# Patient Record
Sex: FEMALE | Race: WHITE | Employment: OTHER | URBAN - METROPOLITAN AREA
[De-identification: names, ages, dates, MRNs, and addresses within clinical notes are randomized per-mention and may not be internally consistent; named-entity substitution may affect disease eponyms.]

---

## 2017-02-07 DIAGNOSIS — J45.31 ASTHMATIC BRONCHITIS, MILD PERSISTENT, WITH ACUTE EXACERBATION: ICD-10-CM

## 2017-02-07 DIAGNOSIS — E78.9 LIPID DISORDER: ICD-10-CM

## 2017-02-07 DIAGNOSIS — I10 ESSENTIAL HYPERTENSION: ICD-10-CM

## 2017-02-07 RX ORDER — ALBUTEROL SULFATE 90 UG/1
2 AEROSOL, METERED RESPIRATORY (INHALATION)
Qty: 1 INHALER | Refills: 1 | Status: SHIPPED | OUTPATIENT
Start: 2017-02-07 | End: 2017-03-28 | Stop reason: SDUPTHER

## 2017-02-07 RX ORDER — PRAVASTATIN SODIUM 40 MG/1
40 TABLET ORAL
Qty: 90 TAB | Refills: 3 | Status: SHIPPED | OUTPATIENT
Start: 2017-02-07 | End: 2018-01-29 | Stop reason: SDUPTHER

## 2017-02-07 RX ORDER — AMLODIPINE BESYLATE 10 MG/1
10 TABLET ORAL DAILY
Qty: 90 TAB | Refills: 3 | Status: SHIPPED | OUTPATIENT
Start: 2017-02-07 | End: 2018-01-29 | Stop reason: SDUPTHER

## 2017-02-07 NOTE — TELEPHONE ENCOUNTER
Patient would like refills of the following and would like to pick them up as hard scripts to take to [facility]. Requested Prescriptions     Pending Prescriptions Disp Refills    amLODIPine (NORVASC) 10 mg tablet 90 Tab 3     Sig: Take 1 Tab by mouth daily.  pravastatin (PRAVACHOL) 40 mg tablet 90 Tab 3     Sig: Take 1 Tab by mouth nightly.  albuterol (PROVENTIL HFA, VENTOLIN HFA, PROAIR HFA) 90 mcg/actuation inhaler 1 Inhaler 1     Sig: Take 2 Puffs by inhalation every six (6) hours as needed for Wheezing.

## 2017-03-28 ENCOUNTER — OFFICE VISIT (OUTPATIENT)
Dept: FAMILY MEDICINE CLINIC | Age: 67
End: 2017-03-28

## 2017-03-28 VITALS
WEIGHT: 225 LBS | OXYGEN SATURATION: 95 % | TEMPERATURE: 97.9 F | SYSTOLIC BLOOD PRESSURE: 136 MMHG | RESPIRATION RATE: 26 BRPM | HEART RATE: 64 BPM | BODY MASS INDEX: 41.41 KG/M2 | HEIGHT: 62 IN | DIASTOLIC BLOOD PRESSURE: 79 MMHG

## 2017-03-28 DIAGNOSIS — Z12.11 COLON CANCER SCREENING: ICD-10-CM

## 2017-03-28 DIAGNOSIS — Z13.220 LIPID SCREENING: ICD-10-CM

## 2017-03-28 DIAGNOSIS — Z13.5 GLAUCOMA SCREENING: ICD-10-CM

## 2017-03-28 DIAGNOSIS — Z13.29 SCREENING FOR THYROID DISORDER: ICD-10-CM

## 2017-03-28 DIAGNOSIS — H91.90 HOH (HARD OF HEARING), UNSPECIFIED LATERALITY: ICD-10-CM

## 2017-03-28 DIAGNOSIS — J40 BRONCHITIS: ICD-10-CM

## 2017-03-28 DIAGNOSIS — Z00.00 HEALTH CARE MAINTENANCE: Primary | ICD-10-CM

## 2017-03-28 DIAGNOSIS — R06.2 WHEEZING: ICD-10-CM

## 2017-03-28 DIAGNOSIS — Z13.31 SCREENING FOR DEPRESSION: ICD-10-CM

## 2017-03-28 DIAGNOSIS — J45.31 ASTHMATIC BRONCHITIS, MILD PERSISTENT, WITH ACUTE EXACERBATION: ICD-10-CM

## 2017-03-28 DIAGNOSIS — Z12.31 ENCOUNTER FOR SCREENING MAMMOGRAM FOR BREAST CANCER: ICD-10-CM

## 2017-03-28 DIAGNOSIS — R53.83 FATIGUE, UNSPECIFIED TYPE: ICD-10-CM

## 2017-03-28 DIAGNOSIS — M54.2 NECK PAIN: ICD-10-CM

## 2017-03-28 DIAGNOSIS — I89.0 LYMPHEDEMA: ICD-10-CM

## 2017-03-28 RX ORDER — AZITHROMYCIN 250 MG/1
TABLET, FILM COATED ORAL
Qty: 6 TAB | Refills: 0 | Status: SHIPPED | OUTPATIENT
Start: 2017-03-28 | End: 2017-04-02

## 2017-03-28 RX ORDER — BENZONATATE 200 MG/1
200 CAPSULE ORAL
Qty: 21 CAP | Refills: 0 | Status: SHIPPED | OUTPATIENT
Start: 2017-03-28 | End: 2017-04-04

## 2017-03-28 RX ORDER — PREDNISONE 20 MG/1
20 TABLET ORAL
Qty: 5 TAB | Refills: 0 | Status: SHIPPED | OUTPATIENT
Start: 2017-03-28 | End: 2018-01-29 | Stop reason: SDUPTHER

## 2017-03-28 RX ORDER — ALBUTEROL SULFATE 90 UG/1
2 AEROSOL, METERED RESPIRATORY (INHALATION)
Qty: 1 INHALER | Refills: 1 | Status: SHIPPED | OUTPATIENT
Start: 2017-03-28 | End: 2018-01-29 | Stop reason: SDUPTHER

## 2017-03-28 NOTE — PATIENT INSTRUCTIONS
Cancel your back injection    Labs and xrays today    Prescriptions printed    Stay active    Obtain breathing test and mammogram    Please call Aren Verde to help arrange and authorize any tests and/or referrals.   Her # is [phone] at [facility] is #[phone]

## 2017-03-28 NOTE — PROGRESS NOTES
Teresa Denny is a 69 y.o. female      Issues discussed today include:        Signs and symptoms:  Cough producing yellow sputum  Duration: several weeks  Context:  +exposures  Location:  Head and chest  Quality:  congestion  Severity:  Preventing rest  Timing:  now  Modifying factors:  No fevers, +wheezing. She's coughed so much her neck now hurts    Due to this cough, she needs to postpone JOSE for her back      Data reviewed or ordered today:  XR, labs    WELLNESS     GYN:  menopause  Mammogram:  Needs 2017  LMP:  [address]  last pap:  No longer getting  DEXA:  done    Hearing screen:   Done=bilateral hearing aids  Vision screening:  Needs 2017, Dr. Anushka Joshua  Depression screening:   Done, PHQ9 = 2, see scan  Fall assessment:   done    BMI: Body mass index is 41.15 kg/(m^2). I have reviewed/discussed the above normal BMI with the patient. I have recommended the following interventions: encourage exercise . .  letter      Colonoscopy:  consider colonoscopy Dr. Liudmila Cage #[phone]  FOBT:  2017  TDAP:    Pneumovax:  2016  PCV-13:  2015  Flu shot:  2016  Zostavax:  done  Eye exam:  Needs 2017  EK    FTF for DME:  done:  Hearing aids (she gets at [facility]), lymphedema pumping device, support hose  Advanced directive:  Full code  Specialists:  Chase Nelsonigham  Lymphedema clinic  [facility]  [facility] [address]    In general, I advise patients to be as active as possible. I believe exercise is the key to long life and good health. The current recommendation is for individuals to exercise for 150 minutes each week (in other words 30 minutes 5 days a week). Exercise should be vigorous enough to work up a sweat. These activities include brisk walking, running, tennis, swimming, weight-lifting, etc.     I usually tell folks that work is work and exercise is exercise. Each of these activities has a different goal.  Even though you may be active at work, it may not be aerobically adequate.   So build dedicated exercise time into your weekly routine. If a patient drinks alcohol, I suggest that a male drink only 2 beers (or glasses of wine, or shots of liquor) in any 24 hour period ( and not daily). For females, the limits are one drink per 24 hours (and not daily). After these limits, the toxic effects of alcohol consumption start to manifest.     Avoid tobacco products. I may provide separate information discussing specific smoking cessation instructions if needed. I suggest a wellness exam yearly during your birth month to update health maintenance issues such as fasting labs, EKGs and other studies, appropriate cancer screenings,  immunizations, etc.      I suggest a yearly flu shot    Please call Maggie Anderson to help arrange and authorize any tests or referrals. Her # is [phone]         Other problems include:  Patient Active Problem List   Diagnosis Code    History of normal resting EKG [id number]    CVD (cerebrovascular disease) I67.9    S/P colonoscopy Z98.890    GERD (gastroesophageal reflux disease) K21.9    [place] (hard of hearing) H91.90    Migraine G43.909   [address] maintenance Z00.00    Obesity, Class II, BMI 35-39.9, with comorbidity (HCC) E66.01    Osteoarthritis (arthritis due to wear and tear of joints) M19.90    DDD (degenerative disc disease), lumbar M51.36    Wheezing R06.2    Essential hypertension with goal blood pressure less than 140/90 I10    Pure hypercholesterolemia E78.00    Bilateral carotid artery stenosis I65.23    History of cervical dysplasia Z87.410    Lymphedema I89.0       Medications:  Current Outpatient Prescriptions   Medication Sig Dispense Refill    azithromycin (ZITHROMAX) 250 mg tablet Take 2 tablets today, then take 1 tablet daily 6 Tab 0    benzonatate (TESSALON) 200 mg capsule Take 1 Cap by mouth three (3) times daily as needed for Cough for up to 7 days. 21 Cap 0    predniSONE (DELTASONE) 20 mg tablet Take 1 Tab by mouth daily (with breakfast).  5 Tab 0    albuterol (PROVENTIL HFA, VENTOLIN HFA, PROAIR HFA) 90 mcg/actuation inhaler Take 2 Puffs by inhalation every six (6) hours as needed for Wheezing. 1 Inhaler 1    amLODIPine (NORVASC) 10 mg tablet Take 1 Tab by mouth daily. 90 Tab 3    pravastatin (PRAVACHOL) 40 mg tablet Take 1 Tab by mouth nightly. 90 Tab 3    aspirin delayed-release 81 mg tablet Take  by mouth daily.  calcium carbonate (TUMS) 200 mg calcium (500 mg) chew Take 1 Tab by mouth as needed.  docusate sodium (COLACE) 100 mg capsule Take 100 mg by mouth two (2) times a day.  ferrous sulfate (IRON, FERROUS SULFATE,) 325 mg (65 mg iron) tablet Take  by mouth Daily (before breakfast).  cholecalciferol, vitamin D3, (VITAMIN D3) 2,000 unit tab Take  by mouth daily. Allergies: Allergies   Allergen Reactions    Keflex [Cephalexin] Hives    Penicillins Hives    Sulfa (Sulfonamide Antibiotics) Unknown (comments)     Patient doesn't know or remember        LMP:  Patient's last menstrual period was 01/28/2000. Social History     Social History    Marital status:      Spouse name: N/A    Number of children: N/A    Years of education: N/A     Occupational History    Not on file.      Social History Main Topics    Smoking status: Former Smoker     Quit date: 1/28/1985    Smokeless tobacco: Never Used    Alcohol use No    Drug use: No    Sexual activity: Yes     Partners: Male     Other Topics Concern    Not on file     Social History Narrative         Family History   Problem Relation Age of Onset    Hypertension Father     Heart Disease Father     Diabetes Father     Heart Disease Mother     COPD Sister          Meaningful use:  done      ROS:  Headaches:  no  Chest Pain:  no  SOB:  no  Fevers:  no  Other significant ROS:  Cough, wheezing    Patient denied problems with:    speaking/swallowing, Reflux/indigestion, Cough,Diarrhea/constipation,Problems passing or controlling urine,  Mood (anxiety/depression/losing interest in doing things that were previously enjoyed),                                                             Any other Positive ROS include: fatigue, edema, shoulder and neck pain from coughing, she was supposed to get an JOSE soon for her back        Falls in the past 12 months:  no           Exercise:  Needs more             Smoking history:  former                                Patient's last menstrual period was 01/28/2000. Physical Exam  Visit Vitals    /79    Pulse 64    Temp 97.9 °F (36.6 °C) (Oral)    Resp 26    Ht 5' 2\" (1.575 m)    Wt 225 lb (102.1 kg)    LMP 01/28/2000    SpO2 95%    BMI 41.15 kg/m2     BP Readings from Last 3 Encounters:   03/28/17 136/79   12/22/16 122/76   07/06/16 142/78     Constitutional:  Appears well,  No Acute Distress, Vitals noted  Psychiatric:   Affect normal, Alert and cooperative, Oriented to person/place/time    Eyes:   Pupils equally round and reactive, EOMI, conjunctiva clear, eyelids normal  ENT:   External ears and nose normal/lips, teeth=OK/gums normal, TMs and Orophyarynx normal  Neck:   general inspection and Thyroid normal.  No abnormal cervical or supraclavicular nodes    Lungs:  +wheezing to auscultation, good respiratory effort  Heart: Ausculation normal.  Regular rhythm. No cardiac murmurs.   No carotid bruits or palpable thrills  Chest wall normal  Abd:  benign  Extremities:  ++ edema, good peripheral pulses  Skin:   Warm to palpation, without rashes, bruising, or suspicious lesions     Neuro:  No facial droop, speech fluent, EOMI, Pupils equally round and reactive to light, visual fields seem OK, hearing seems normal and symmetrical,smile symmetrical, puffs out cheeks symmetrically    Shoulder shrug symmetrical     moves all extremities, strength/sensationseem intact and symmetrical    Rapid alternating movements of hands normal and symmetrical    balance seems OK, no pronator drift, gait normal. \"get up and go\" test OK    squats OK, heel standing/toe standing OK    no tenderness of C spine, T spine, LS spine, flexion/extension of spine OK    Affect seems appropriate, no obvious mental processing problems    MSK:  Full ROM all joints                  Assessment:    Patient Active Problem List   Diagnosis Code    History of normal resting EKG [id number]    CVD (cerebrovascular disease) I67.9    S/P colonoscopy Z98.890    GERD (gastroesophageal reflux disease) K21.9    [place] (hard of hearing) H91.90    Migraine G43.909    Healthcare maintenance Z00.00    Obesity, Class II, BMI 35-39.9, with comorbidity (HCC) E66.01    Osteoarthritis (arthritis due to wear and tear of joints) M19.90    DDD (degenerative disc disease), lumbar M51.36    Wheezing R06.2    Essential hypertension with goal blood pressure less than 140/90 I10    Pure hypercholesterolemia E78.00    Bilateral carotid artery stenosis I65.23    History of cervical dysplasia Z87.410    Lymphedema I89.0       Today's diagnoses are:    ICD-10-CM ICD-9-CM    1. Health care maintenance Z00.00 V70.0    2. Wheezing R06.2 786.07 REFERRAL TO [facility] PROGRAMS      PULMONARY FUNCTION TEST      XR CHEST PA LAT   3. Lymphedema I89.0 457.1 REFERRAL TO [facility] PROGRAMS      [facility] 3RD GENERATION    FTF for DME for leg pumps and support hose, she sees lymphedema clinic   4. [place] (hard of hearing), unspecified laterality H91.90 389.9 REFERRAL TO [facility] PROGRAMS    sees [facility] for hearing aids   5. Screening for depression Z13.89 V79.0 MT DEPRESSION SCREEN ANNUAL   6. Colon cancer screening Z12.11 V76.51 AMB POC FECAL BLOOD, OCCULT, QL 3 CARDS   7. Encounter for screening mammogram for breast cancer Z12.31 V76.12 [facility] MAMMO BI SCREENING INCL CAD   8. BMI 40.0-44.9, adult ([address] Z68.41 V85.41 REFERRAL TO [facility] PROGRAMS      [facility] 3RD GENERATION    see letter   9. Neck pain M54.2 723.1 XR SPINE CERV 4 OR 5 V   10.  Bronchitis J40 490 azithromycin (ZITHROMAX) 250 mg tablet      benzonatate (TESSALON) 200 mg capsule predniSONE (DELTASONE) 20 mg tablet      CBC W/O DIFF      METABOLIC PANEL, COMPREHENSIVE   11. Lipid screening Z13.220 V77.91 CHOLESTEROL, HDL      CHOLESTEROL, TOTAL      LDL, DIRECT   12. Screening for thyroid disorder Z13.29 V77.0 TSH 3RD GENERATION   13. Fatigue, unspecified type R53.83 780.79 TSH 3RD GENERATION   14. Asthmatic bronchitis, mild persistent, with acute exacerbation J45.31 493.92 albuterol (PROVENTIL HFA, VENTOLIN HFA, PROAIR HFA) 90 mcg/actuation inhaler       Plan:  Orders Placed This Encounter    [facility] MAMMO BI SCREENING INCL CAD     Standing Status:   Future     Standing Expiration Date:   4/28/2018     Order Specific Question:   Reason for Exam     Answer:   screening    XR CHEST PA LAT     Standing Status:   Future     Number of Occurrences:   1     Standing Expiration Date:   4/28/2018     Order Specific Question:   Reason for Exam     Answer:   cough     Order Specific Question:   Is Patient Allergic to Contrast Dye? Answer:   Unknown    XR SPINE CERV 4 OR 5 V     Standing Status:   Future     Number of Occurrences:   1     Standing Expiration Date:   4/27/2018     Order Specific Question:   Reason for Exam     Answer:   neck pain     Order Specific Question:   Is Patient Allergic to Contrast Dye?      Answer:   Unknown    CBC W/O DIFF    METABOLIC PANEL, COMPREHENSIVE    CHOLESTEROL, HDL    CHOLESTEROL, TOTAL    LDL, DIRECT    TSH 3RD GENERATION    [facility]     Referral Priority:   Routine     Referral Type:   Consultation     Referral Reason:   Specialty Services Required    AMB FECAL OCCULT BLOOD QL-3 CARDS    PULMONARY FUNCTION TEST     Standing Status:   Future     Standing Expiration Date:   9/28/2017    ME DEPRESSION SCREEN ANNUAL    azithromycin (ZITHROMAX) 250 mg tablet     Sig: Take 2 tablets today, then take 1 tablet daily     Dispense:  6 Tab     Refill:  0    benzonatate (TESSALON) 200 mg capsule     Sig: Take 1 Cap by mouth three (3) times daily as needed for Cough for up to 7 days. Dispense:  21 Cap     Refill:  0    predniSONE (DELTASONE) 20 mg tablet     Sig: Take 1 Tab by mouth daily (with breakfast). Dispense:  5 Tab     Refill:  0    albuterol (PROVENTIL HFA, VENTOLIN HFA, PROAIR HFA) 90 mcg/actuation inhaler     Sig: Take 2 Puffs by inhalation every six (6) hours as needed for Wheezing. Dispense:  1 Inhaler     Refill:  1       See patient instructions  Patient Instructions   Cancel your back injection    Labs and xrays today    Prescriptions printed    Stay active    Obtain breathing test and mammogram    Please call Abbie to help arrange and authorize any tests and/or referrals.   Her # is [phone] at Jhonatan Madden is #[phone]          refresh note:  done    AVS Printed:  done

## 2017-03-28 NOTE — MR AVS SNAPSHOT
Visit Information Date & Time Provider Department Dept. Phone Encounter #  
 3/28/2017  3:00 PM Ladi De Anda MD [facility] [phone] [id number] Your Appointments 7/5/2017  1:20 PM  
ESTABLISHED PATIENT with Clarice Alanis MD  
[facility] ([facility] SCHEDULING) Appt Note: annual; 2/8/17 lm for pt of appt time and day change from 7/12/17  sll [address] [facility] [place] [address] [facility]  
[address] [facility] [place] [address] [address] Upcoming Health Maintenance Date Due FOBT Q 1 YEAR AGE 50-75 3/28/2018* MEDICARE YEARLY EXAM 3/29/2018 GLAUCOMA SCREENING Q2Y 3/28/2019 BREAST CANCER SCRN MAMMOGRAM 3/28/2019 DTaP/Tdap/Td series (2 - Td) 3/12/2025 *Topic was postponed. The date shown is not the original due date. Allergies as of 3/28/2017  Review Complete On: 3/28/2017 By: Adeola Blanchard Severity Noted Reaction Type Reactions Keflex [Cephalexin]  07/27/2015   Systemic Hives Penicillins  03/24/2016    Hives Sulfa (Sulfonamide Antibiotics)  04/07/2015    Unknown (comments) Patient doesn't know or remember Current Immunizations  Reviewed on 3/28/2017 Name Date Influenza Vaccine 9/1/2016, 9/28/2014 Pneumococcal Conjugate (PCV-13) 3/12/2015 Pneumococcal Vaccine (Unspecified Type) 8/1/2016 Tdap 3/12/2015 Reviewed by Adeola Blanchard on 3/28/2017 at  3:25 PM  
You Were Diagnosed With   
  
 Codes Comments Health care maintenance    -  Primary ICD-10-CM: Z00.00 ICD-9-CM: V70.0 Wheezing     ICD-10-CM: R06.2 ICD-9-CM: 786.07 Lymphedema     ICD-10-CM: I89.0 ICD-9-CM: 457.1 FTF for DME for leg pumps and support hose, she sees lymphedema clinic [place] (hard of hearing), unspecified laterality     ICD-10-CM: H91.90 ICD-9-CM: 389.9 sees [facility] for hearing aids  Screening for depression     ICD-10-CM: Z13.89 
 ICD-9-CM: V79.0 Colon cancer screening     ICD-10-CM: Z12.11 ICD-9-CM: V76.51 Encounter for screening mammogram for breast cancer     ICD-10-CM: Z12.31 
ICD-9-CM: V76.12 BMI 40.0-44.9, adult [facility])     ICD-10-CM: Z68.41 
ICD-9-CM: V85.41 see letter Neck pain     ICD-10-CM: M54.2 ICD-9-CM: 723.1 Bronchitis     ICD-10-CM: J40 ICD-9-CM: 071 Lipid screening     ICD-10-CM: H62.879 ICD-9-CM: V77.91 Screening for thyroid disorder     ICD-10-CM: Z13.29 ICD-9-CM: V77.0 Fatigue, unspecified type     ICD-10-CM: R53.83 ICD-9-CM: 780.79 Vitals BP Pulse Temp Resp Height(growth percentile) Weight(growth percentile) 136/79 64 97.9 °F (36.6 °C) (Oral) 26 5' 2\" (1.575 m) 225 lb (102.1 kg) LMP SpO2 BMI OB Status Smoking Status 01/28/2000 95% 41.15 kg/m2 Postmenopausal Former Smoker BMI and BSA Data Body Mass Index Body Surface Area  
 41.15 kg/m 2 2.11 m 2 Preferred Pharmacy Pharmacy Name Phone [facility] [phone] Your Updated Medication List  
  
   
This list is accurate as of: 3/28/17  4:13 PM.  Always use your most recent med list.  
  
  
  
  
 albuterol 90 mcg/actuation inhaler Commonly known as:  PROVENTIL HFA, VENTOLIN HFA, PROAIR HFA Take 2 Puffs by inhalation every six (6) hours as needed for Wheezing. amLODIPine 10 mg tablet Commonly known as:  Kimberli Croon Take 1 Tab by mouth daily. aspirin delayed-release 81 mg tablet Take  by mouth daily. azithromycin 250 mg tablet Commonly known as:  Delorse Sabot Take 2 tablets today, then take 1 tablet daily  
  
 benzonatate 200 mg capsule Commonly known as:  TESSALON Take 1 Cap by mouth three (3) times daily as needed for Cough for up to 7 days. calcium carbonate 200 mg calcium (500 mg) Chew Commonly known as:  TUMS Take 1 Tab by mouth as needed. docusate sodium 100 mg capsule Commonly known as:  stickK Farm  
 Take 100 mg by mouth two (2) times a day. Iron (Ferrous Sulfate) 325 mg (65 mg iron) tablet Generic drug:  ferrous sulfate Take  by mouth Daily (before breakfast). pravastatin 40 mg tablet Commonly known as:  PRAVACHOL Take 1 Tab by mouth nightly. predniSONE 20 mg tablet Commonly known as:  Alinda Jessica Take 1 Tab by mouth daily (with breakfast). VITAMIN D3 2,000 unit Tab Generic drug:  cholecalciferol (vitamin D3) Take  by mouth daily. Prescriptions Printed Refills  
 azithromycin (ZITHROMAX) 250 mg tablet 0 Sig: Take 2 tablets today, then take 1 tablet daily Class: Print  
 benzonatate (TESSALON) 200 mg capsule 0 Sig: Take 1 Cap by mouth three (3) times daily as needed for Cough for up to 7 days. Class: Print Route: Oral  
 predniSONE (DELTASONE) 20 mg tablet 0 Sig: Take 1 Tab by mouth daily (with breakfast). Class: Print Route: Oral  
  
We Performed the Following AMB POC FECAL BLOOD, OCCULT, QL 3 CARDS [98823 CPT(R)] CBC W/O DIFF [27711 CPT(R)] CHOLESTEROL, HDL [id number] CPT(R)] CHOLESTEROL, TOTAL [97726 CPT(R)] LDL, DIRECT [id number] CPT(R)] METABOLIC PANEL, COMPREHENSIVE [36875 CPT(R)] [address] [place] clypd [ [facility]] REFERRAL TO [facility] PROGRAMS [[id number] Custom] Comments:  
 patient has been referred into the [facility] Programs indicated above.   She is currently being managed for the following chronic conditions: has History of normal resting EKG, CVD (cerebrovascular disease), S/P colonoscopy, GERD (gastroesophageal reflux disease), [place] (hard of hearing), Migraine, Healthcare maintenance, Obesity, Class II, BMI 35-39.9, with comorbidity ([address] Osteoarthritis (arthritis due to wear and tear of joints), DDD (degenerative disc disease), lumbar, Wheezing, Essential hypertension with goal blood pressure less than 140/90, Pure hypercholesterolemia, Bilateral carotid artery stenosis, History of cervical dysplasia, and Lymphedema on her problem list.  
 TSH 3RD GENERATION [47681 CPT(R)] To-Do List   
 03/28/2017 Imaging:  [facility] MAMMO BI SCREENING INCL CAD   
  
 03/28/2017 PFT:  PULMONARY FUNCTION TEST   
  
 03/28/2017 Imaging:  XR CHEST PA LAT   
  
 03/28/2017 Imaging:  XR SPINE CERV 4 OR 5 V Referral Information Referral ID Referred By Referred To  
  
 [id number] Melanie Celaya Not Available Visits Status Start Date End Date 1 New Request 3/28/17 3/28/18 If your referral has a status of pending review or denied, additional information will be sent to support the outcome of this decision. Patient Instructions Cancel your back injection Labs and xrays today Prescriptions printed Stay active Obtain breathing test 
 
Please call Delmi Hanna to help arrange and authorize any tests and/or referrals. Her # [phone] Central Scheduling at Romayne Duster is #[phone] Introducing [facility] SERVICES! Dear Rolando Esparza: Thank you for requesting a [employer] account. Our records indicate that you already have an active [employer] account. You can access your account anytime at [email or web address] Did you know that you can access your hospital and ER discharge instructions at any time in [employer]? You can also review all of your test results from your hospital stay or ER visit. Additional Information If you have questions, please visit the Frequently Asked Questions section of the [employer] website at [email or web address] Remember, [employer] is NOT to be used for urgent needs. For medical emergencies, dial 911. Now available from your iPhone and Android! Please provide this summary of care documentation to your next provider. Your primary care clinician is listed as Jesús Angel. If you have any questions after today's visit, please call [phone].

## 2017-03-28 NOTE — LETTER
3/28/2017 6:12 PM 
 
Ms. Frankie Gil 
[address] [facility] [address] [phone] See Dr. Rajesh Taveras for your yearly eye exam.  #[phone]. Please call [facility] to help arrange and authorize any tests and/or referrals. Her # is [phone] Sincerely, Tatiana Bloch, MD

## 2017-03-28 NOTE — LETTER
3/28/2017 4:07 PM 
 
Ms. Candy Soto 
[address]  [place] [address] [phone] Body mass index is 41.15 kg/(m^2). Focus on regular exercise (150 minutes each week) and healthy eating. Eat more fruits and vegetables. Eat more protein (egg whites, beans, and nuts you know you tolerate) and less carbohydrates (white bread, white rice, white pasta, white potatoes, sodas, and sweets). Eat appropriately small portion sizes. Sincerely, Phill Clark MD

## 2017-03-28 NOTE — LETTER
3/28/2017 6:14 PM 
 
Ms. Coco Dan 
[address] [phone] Body mass index is 41.15 kg/(m^2). Focus on regular exercise (150 minutes each week) and healthy eating. Eat more fruits and vegetables. Eat more protein (egg whites, beans, and nuts you know you tolerate) and less carbohydrates (white bread, white rice, white pasta, white potatoes, sodas, and sweets). Eat appropriately small portion sizes. .   
 
 
 
 
Sincerely, Venecia Stafford MD

## 2017-03-28 NOTE — PROGRESS NOTES
Chief Complaint   Patient presents with    Welcome To Medicare     is not fasting. Shoulder pain on both sides for a few months. Has a hard time turning head side to side. Has a cough that she had in December that went away but has come back. Reviewed record in preparation for visit and have obtained necessary documentation. 1. Have you been to the ER, urgent care clinic since your last visit? Hospitalized since your last visit? No    2. Have you seen or consulted any other health care providers outside of the [address] [place] [address] since your last visit? Include any pap smears or colon screening.  No

## 2017-04-04 ENCOUNTER — LAB ONLY (OUTPATIENT)
Dept: FAMILY MEDICINE CLINIC | Age: 67
End: 2017-04-04

## 2017-04-04 ENCOUNTER — HOSPITAL ENCOUNTER (OUTPATIENT)
Dept: LAB | Age: 67
Discharge: HOME | End: 2017-04-04
Payer: MEDICARE

## 2017-04-04 PROCEDURE — 85027 COMPLETE CBC AUTOMATED: CPT

## 2017-04-04 PROCEDURE — 84443 ASSAY THYROID STIM HORMONE: CPT

## 2017-04-04 PROCEDURE — 83718 ASSAY OF LIPOPROTEIN: CPT

## 2017-04-04 PROCEDURE — 82465 ASSAY BLD/SERUM CHOLESTEROL: CPT

## 2017-04-04 PROCEDURE — 80053 COMPREHEN METABOLIC PANEL: CPT

## 2017-04-04 PROCEDURE — 83721 ASSAY OF BLOOD LIPOPROTEIN: CPT

## 2017-04-04 NOTE — MR AVS SNAPSHOT
Visit Information Date & Time Provider Department Dept. Phone Encounter #  
 4/4/2017  8:15 AM LAB [facility] [address] [facility] [phone] [id number] Your Appointments 7/5/2017  1:20 PM  
ESTABLISHED PATIENT with Pradip Clayton MD  
[facility] ([facility] SCHEDULING) Appt Note: annual; 2/8/17 lm for pt of appt time and day change from 7/12/17  sll 354 [facility] [place] [address] 1007 [facility]  
54 [facility] [place] [address] [address] Upcoming Health Maintenance Date Due FOBT Q 1 YEAR AGE 50-75 3/28/2018* MEDICARE YEARLY EXAM 3/29/2018 GLAUCOMA SCREENING Q2Y 3/28/2019 BREAST CANCER SCRN MAMMOGRAM 3/28/2019 DTaP/Tdap/Td series (2 - Td) 3/12/2025 *Topic was postponed. The date shown is not the original due date. Allergies as of 4/4/2017  Review Complete On: 3/28/2017 By: Olimpia Chau MD  
  
 Severity Noted Reaction Type Reactions Keflex [Cephalexin]  07/27/2015   Systemic Hives Penicillins  03/24/2016    Hives Sulfa (Sulfonamide Antibiotics)  04/07/2015    Unknown (comments) Patient doesn't know or remember Current Immunizations  Reviewed on 3/28/2017 Name Date Influenza Vaccine 9/1/2016, 9/28/2014 Pneumococcal Conjugate (PCV-13) 3/12/2015 Pneumococcal Vaccine (Unspecified Type) 8/1/2016 Tdap 3/12/2015 Not reviewed this visit Vitals LMP OB Status Smoking Status 01/28/2000 Postmenopausal Former Smoker Preferred Pharmacy Pharmacy Name Phone [address] [phone] Your Updated Medication List  
  
   
This list is accurate as of: 4/4/17  4:39 PM.  Always use your most recent med list.  
  
  
  
  
 albuterol 90 mcg/actuation inhaler Commonly known as:  PROVENTIL HFA, VENTOLIN HFA, PROAIR HFA  
 Take 2 Puffs by inhalation every six (6) hours as needed for Wheezing. amLODIPine 10 mg tablet Commonly known as:  John Gerson Take 1 Tab by mouth daily. aspirin delayed-release 81 mg tablet Take  by mouth daily. benzonatate 200 mg capsule Commonly known as:  TESSALON Take 1 Cap by mouth three (3) times daily as needed for Cough for up to 7 days. calcium carbonate 200 mg calcium (500 mg) Chew Commonly known as:  TUMS Take 1 Tab by mouth as needed. docusate sodium 100 mg capsule Commonly known as:  Bill Tucson Take 100 mg by mouth two (2) times a day. Iron (Ferrous Sulfate) 325 mg (65 mg iron) tablet Generic drug:  ferrous sulfate Take  by mouth Daily (before breakfast). pravastatin 40 mg tablet Commonly known as:  PRAVACHOL Take 1 Tab by mouth nightly. predniSONE 20 mg tablet Commonly known as:  Sherra Clinton Take 1 Tab by mouth daily (with breakfast). VITAMIN D3 2,000 unit Tab Generic drug:  cholecalciferol (vitamin D3) Take  by mouth daily. To-Do List   
 04/07/2017 9:00 AM  
  Appointment with [facility] MATEUSZ 2 at [facility] Mammography ([phone]) Shower or bathe using soap and water. Do not use deodorant, powder, perfumes, or lotion the day of your exam.  If your prior mammograms were not performed at [facility] 6 please bring films with you or forward prior images 2 days before your procedure. Check in at registration 15min before your appointment time unless you were instructed to do otherwise. A script is not necessary, but if you have one, please bring it on the day of the mammogram or have it faxed to the department. [facility] [phone] [facility]  [phone] [facility] [phone] CARISA  [phone] [facility] [phone] [facility] [phone] [facility] [phone] [facility] [phone]  
  
 04/07/2017 10:00 AM  
(Arrive by 9:30 AM) Appointment with PULMONARY LAB [facility] at [facility] PULMONARY LAB ([phone]) 1. Bring list of current medications or bag medicines. 2. No breathing medicines 6 hours before the procedure. 3. Patient should eat light the day of procedure. Introducing [facility] SERVICES! Dear Benton Lefort: Thank you for requesting a Char Software account. Our records indicate that you already have an active Char Software account. You can access your account anytime at [email or web address] Did you know that you can access your hospital and ER discharge instructions at any time in Char Software? You can also review all of your test results from your hospital stay or ER visit. Additional Information If you have questions, please visit the Frequently Asked Questions section of the Char Software website at [email or web address] Remember, Char Software is NOT to be used for urgent needs. For medical emergencies, dial 911. Now available from your iPhone and Android! Please provide this summary of care documentation to your next provider. Your primary care clinician is listed as Machelle Leger. If you have any questions after today's visit, please call [phone].

## 2017-04-05 LAB
ALBUMIN SERPL-MCNC: 4.1 G/DL (ref 3.6–4.8)
ALBUMIN/GLOB SERPL: 1.6 {RATIO} (ref 1.2–2.2)
ALP SERPL-CCNC: 50 IU/L (ref 39–117)
ALT SERPL-CCNC: 22 IU/L (ref 0–32)
AST SERPL-CCNC: 24 IU/L (ref 0–40)
BILIRUB SERPL-MCNC: 0.4 MG/DL (ref 0–1.2)
BUN SERPL-MCNC: 15 MG/DL (ref 8–27)
BUN/CREAT SERPL: 17 (ref 12–28)
CALCIUM SERPL-MCNC: 9.3 MG/DL (ref 8.7–10.3)
CHLORIDE SERPL-SCNC: 100 MMOL/L (ref 96–106)
CHOLEST SERPL-MCNC: 159 MG/DL (ref 100–199)
CO2 SERPL-SCNC: 27 MMOL/L (ref 18–29)
CREAT SERPL-MCNC: 0.89 MG/DL (ref 0.57–1)
ERYTHROCYTE [DISTWIDTH] IN BLOOD BY AUTOMATED COUNT: 14 % (ref 12.3–15.4)
GLOBULIN SER CALC-MCNC: 2.5 G/DL (ref 1.5–4.5)
GLUCOSE SERPL-MCNC: 97 MG/DL (ref 65–99)
HCT VFR BLD AUTO: 40 % (ref 34–46.6)
HDLC SERPL-MCNC: 70 MG/DL
HGB BLD-MCNC: 13 G/DL (ref 11.1–15.9)
INTERPRETATION, 910389: NORMAL
LDLC SERPL DIRECT ASSAY-MCNC: 80 MG/DL (ref 0–99)
MCH RBC QN AUTO: 30 PG (ref 26.6–33)
MCHC RBC AUTO-ENTMCNC: 32.5 G/DL (ref 31.5–35.7)
MCV RBC AUTO: 92 FL (ref 79–97)
PLATELET # BLD AUTO: 403 X10E3/UL (ref 150–379)
POTASSIUM SERPL-SCNC: 5 MMOL/L (ref 3.5–5.2)
PROT SERPL-MCNC: 6.6 G/DL (ref 6–8.5)
RBC # BLD AUTO: 4.34 X10E6/UL (ref 3.77–5.28)
SODIUM SERPL-SCNC: 143 MMOL/L (ref 134–144)
TSH SERPL DL<=0.005 MIU/L-ACNC: 4.33 UIU/ML (ref 0.45–4.5)
WBC # BLD AUTO: 9.8 X10E3/UL (ref 3.4–10.8)

## 2017-04-05 NOTE — PROGRESS NOTES
Your labs look good. Focus on regular exercise (150 minutes each week) and healthy eating. Eat more fruits and vegetables. Eat more protein (egg whites, beans, and nuts you know you tolerate) and less carbohydrates (white bread, white rice, white pasta, white potatoes, sodas, and sweets). Eat appropriately small portion sizes.

## 2017-04-07 ENCOUNTER — HOSPITAL ENCOUNTER (OUTPATIENT)
Dept: PULMONOLOGY | Age: 67
Discharge: HOME | End: 2017-04-07
Payer: MEDICARE

## 2017-04-07 ENCOUNTER — HOSPITAL ENCOUNTER (OUTPATIENT)
Dept: MAMMOGRAPHY | Age: 67
Discharge: HOME | End: 2017-04-07
Payer: MEDICARE

## 2017-04-07 DIAGNOSIS — Z12.31 ENCOUNTER FOR SCREENING MAMMOGRAM FOR BREAST CANCER: ICD-10-CM

## 2017-04-07 DIAGNOSIS — R06.2 WHEEZING: ICD-10-CM

## 2017-04-07 PROCEDURE — 74011000250 HC RX REV CODE- 250

## 2017-04-07 PROCEDURE — 94060 EVALUATION OF WHEEZING: CPT

## 2017-04-07 PROCEDURE — 77063 BREAST TOMOSYNTHESIS BI: CPT

## 2017-04-07 RX ORDER — ALBUTEROL SULFATE 0.83 MG/ML
2.5 SOLUTION RESPIRATORY (INHALATION)
Status: COMPLETED | OUTPATIENT
Start: 2017-04-07 | End: 2017-04-07

## 2017-04-07 RX ADMIN — ALBUTEROL SULFATE 2.5 MG: 2.5 SOLUTION RESPIRATORY (INHALATION) at 10:45

## 2017-04-07 NOTE — LETTER
4/10/2017 4:45 PM 
 
Ms. Krishna Reilly 
[address]  [place] [phone] Dear Krishna Reilly: 
 
Please find your most recent results below. Resulted Orders MATEUSZ 3D LORA W MAMMO BI SCREENING INCL CAD Narrative STUDY: Bilateral digital screening mammogram with 3-D tomosynthesis INDICATION:  Screening. COMPARISON:  Mammograms 4/9/2015 through 4/10/2012. BREAST COMPOSITION:  There are scattered areas of fibroglandular density. FINDINGS: Bilateral digital screening mammography was performed and is 
interpreted in conjunction with a computer assisted detection (CAD) system. Additionally, tomosynthesis of both breasts in the CC and MLO projections was 
performed. No suspicious masses or calcifications are identified. There has been 
no significant change. Impression IMPRESSION: 
BI-RADS 1: Negative. No mammographic evidence of malignancy. RECOMMENDATIONS: 
Next screening mammogram is recommended in one year. The patient will be notified of these results. Your recent mammogram was normal.  Congratulations.  Please follow up in one year. Sincerely, Selma Solano MD

## 2017-08-29 ENCOUNTER — OFFICE VISIT (OUTPATIENT)
Dept: CARDIOLOGY CLINIC | Age: 67
End: 2017-08-29

## 2017-08-29 VITALS
HEIGHT: 62 IN | DIASTOLIC BLOOD PRESSURE: 70 MMHG | HEART RATE: 71 BPM | WEIGHT: 231 LBS | SYSTOLIC BLOOD PRESSURE: 130 MMHG | RESPIRATION RATE: 16 BRPM | OXYGEN SATURATION: 98 % | BODY MASS INDEX: 42.51 KG/M2

## 2017-08-29 DIAGNOSIS — I10 ESSENTIAL HYPERTENSION WITH GOAL BLOOD PRESSURE LESS THAN 140/90: Primary | ICD-10-CM

## 2017-08-29 DIAGNOSIS — E78.5 DYSLIPIDEMIA: ICD-10-CM

## 2017-08-29 NOTE — MR AVS SNAPSHOT
Visit Information Date & Time Provider Department Dept. Phone Encounter #  
 8/29/2017  3:20 PM Tonie Reed [facility] [phone] [id number] Follow-up Instructions Return in about 6 months (around 2/28/2018). Your Appointments 8/30/2018 10:00 AM  
ESTABLISHED PATIENT with Tonie Reed MD  
[facility] (JONO SCHEDULING) Appt Note: annual  
 [address] [place] [address] 1007 [facility]  
54 [facility] [place] [address] [address] Upcoming Health Maintenance Date Due INFLUENZA AGE 9 TO ADULT 8/1/2017 FOBT Q 1 YEAR AGE 50-75 3/28/2018* MEDICARE YEARLY EXAM 3/29/2018 GLAUCOMA SCREENING Q2Y 3/28/2019 BREAST CANCER SCRN MAMMOGRAM 4/7/2019 DTaP/Tdap/Td series (2 - Td) 3/12/2025 *Topic was postponed. The date shown is not the original due date. Allergies as of 8/29/2017  Review Complete On: 8/29/2017 By: Tonie Reed MD  
  
 Severity Noted Reaction Type Reactions Keflex [Cephalexin]  07/27/2015   Systemic Hives Penicillins  03/24/2016    Hives Sulfa (Sulfonamide Antibiotics)  04/07/2015    Unknown (comments) Patient doesn't know or remember Current Immunizations  Reviewed on 3/28/2017 Name Date Influenza Vaccine 9/1/2016, 9/28/2014 Pneumococcal Conjugate (PCV-13) 3/12/2015 Pneumococcal Vaccine (Unspecified Type) 8/1/2016 Tdap 3/12/2015 Not reviewed this visit You Were Diagnosed With   
  
 Codes Comments Essential hypertension with goal blood pressure less than 140/90    -  Primary ICD-10-CM: I10 
ICD-9-CM: 401.9 Dyslipidemia     ICD-10-CM: E78.5 ICD-9-CM: 272.4 Vitals BP Pulse Resp Height(growth percentile) Weight(growth percentile) LMP  
 130/70 (BP 1 Location: Left arm, BP Patient Position: Sitting) 71 16 5' 2\" (1.575 m) 231 lb (104.8 kg) 01/28/2000 SpO2 BMI OB Status Smoking Status 98% 42.25 kg/m2 Postmenopausal Former Smoker Vitals History BMI and BSA Data Body Mass Index Body Surface Area  
 42.25 kg/m 2 2.14 m 2 Preferred Pharmacy Pharmacy Name Phone [address] [phone] Your Updated Medication List  
  
   
This list is accurate as of: 8/29/17  3:46 PM.  Always use your most recent med list.  
  
  
  
  
 albuterol 90 mcg/actuation inhaler Commonly known as:  PROVENTIL HFA, VENTOLIN HFA, PROAIR HFA Take 2 Puffs by inhalation every six (6) hours as needed for Wheezing. amLODIPine 10 mg tablet Commonly known as:  Owen Emerald Take 1 Tab by mouth daily. aspirin delayed-release 81 mg tablet Take  by mouth daily. calcium carbonate 200 mg calcium (500 mg) Chew Commonly known as:  TUMS Take 1 Tab by mouth as needed. docusate sodium 100 mg capsule Commonly known as:  Hermina  Take 100 mg by mouth two (2) times a day. Iron (Ferrous Sulfate) 325 mg (65 mg iron) tablet Generic drug:  ferrous sulfate Take  by mouth Daily (before breakfast). pravastatin 40 mg tablet Commonly known as:  PRAVACHOL Take 1 Tab by mouth nightly. predniSONE 20 mg tablet Commonly known as:  Rere Smoker Take 1 Tab by mouth daily (with breakfast). VITAMIN D3 2,000 unit Tab Generic drug:  cholecalciferol (vitamin D3) Take  by mouth daily. We Performed the Following AMB POC EKG ROUTINE W/ 12 LEADS, INTER & REP [48531 CPT(R)] Follow-up Instructions Return in about 6 months (around 2/28/2018). Introducing [facility] & HEALTH SERVICES! Dear Chavo Galloway: Thank you for requesting a Voyat account. Our records indicate that you already have an active Voyat account. You can access your account anytime at [email or web address] Did you know that you can access your hospital and ER discharge instructions at any time in Invenias? You can also review all of your test results from your hospital stay or ER visit. Additional Information If you have questions, please visit the Frequently Asked Questions section of the Invenias website at [email or web address] Remember, Invenias is NOT to be used for urgent needs. For medical emergencies, dial 911. Now available from your iPhone and Android! Please provide this summary of care documentation to your next provider. Your primary care clinician is listed as Cindy Lambert. If you have any questions after today's visit, please call [phone].

## 2017-08-29 NOTE — PROGRESS NOTES
Visit Vitals    /70 (BP 1 Location: Left arm, BP Patient Position: Sitting)    Pulse 71    Resp 16    Ht 5' 2\" (1.575 m)    Wt 231 lb (104.8 kg)    LMP 01/28/2000    SpO2 98%    BMI 42.25 kg/m2

## 2018-01-29 ENCOUNTER — OFFICE VISIT (OUTPATIENT)
Dept: FAMILY MEDICINE CLINIC | Age: 68
End: 2018-01-29

## 2018-01-29 VITALS
HEART RATE: 82 BPM | RESPIRATION RATE: 16 BRPM | TEMPERATURE: 99 F | BODY MASS INDEX: 43.06 KG/M2 | SYSTOLIC BLOOD PRESSURE: 135 MMHG | WEIGHT: 234 LBS | OXYGEN SATURATION: 96 % | HEIGHT: 62 IN | DIASTOLIC BLOOD PRESSURE: 79 MMHG

## 2018-01-29 DIAGNOSIS — E78.9 LIPID DISORDER: ICD-10-CM

## 2018-01-29 DIAGNOSIS — R73.09 ELEVATED GLUCOSE: ICD-10-CM

## 2018-01-29 DIAGNOSIS — J45.31 ASTHMATIC BRONCHITIS, MILD PERSISTENT, WITH ACUTE EXACERBATION: Primary | ICD-10-CM

## 2018-01-29 DIAGNOSIS — R05.9 COUGH: ICD-10-CM

## 2018-01-29 DIAGNOSIS — J40 BRONCHITIS: ICD-10-CM

## 2018-01-29 DIAGNOSIS — Z13.1 SCREENING FOR DIABETES MELLITUS: ICD-10-CM

## 2018-01-29 DIAGNOSIS — Z13.89 SCREENING FOR OBESITY: ICD-10-CM

## 2018-01-29 DIAGNOSIS — I10 ESSENTIAL HYPERTENSION WITH GOAL BLOOD PRESSURE LESS THAN 140/90: ICD-10-CM

## 2018-01-29 DIAGNOSIS — Z13.31 SCREENING FOR DEPRESSION: ICD-10-CM

## 2018-01-29 DIAGNOSIS — I10 ESSENTIAL HYPERTENSION: ICD-10-CM

## 2018-01-29 DIAGNOSIS — E61.1 LOW IRON: ICD-10-CM

## 2018-01-29 DIAGNOSIS — R53.83 FATIGUE, UNSPECIFIED TYPE: ICD-10-CM

## 2018-01-29 DIAGNOSIS — R06.2 WHEEZING: ICD-10-CM

## 2018-01-29 DIAGNOSIS — E66.01 OBESITY, MORBID (HCC): ICD-10-CM

## 2018-01-29 LAB
FLUAV+FLUBV AG NOSE QL IA.RAPID: NEGATIVE POS/NEG
FLUAV+FLUBV AG NOSE QL IA.RAPID: NEGATIVE POS/NEG
S PYO AG THROAT QL: NEGATIVE
VALID INTERNAL CONTROL?: YES
VALID INTERNAL CONTROL?: YES

## 2018-01-29 RX ORDER — AZITHROMYCIN 250 MG/1
TABLET, FILM COATED ORAL
Qty: 6 TAB | Refills: 0 | Status: SHIPPED | OUTPATIENT
Start: 2018-01-29 | End: 2018-02-03

## 2018-01-29 RX ORDER — AMLODIPINE BESYLATE 10 MG/1
10 TABLET ORAL DAILY
Qty: 90 TAB | Refills: 3 | Status: SHIPPED | OUTPATIENT
Start: 2018-01-29 | End: 2018-10-12 | Stop reason: SDUPTHER

## 2018-01-29 RX ORDER — LINCOMYCIN HYDROCHLORIDE 300 MG/ML
300 INJECTION, SOLUTION INTRAMUSCULAR; INTRAVENOUS; SUBCONJUNCTIVAL ONCE
Qty: 1 VIAL | Refills: 0
Start: 2018-01-29 | End: 2018-01-29

## 2018-01-29 RX ORDER — LANOLIN ALCOHOL/MO/W.PET/CERES
325 CREAM (GRAM) TOPICAL
Qty: 90 TAB | Refills: 0 | Status: SHIPPED | OUTPATIENT
Start: 2018-01-29 | End: 2018-04-03 | Stop reason: SDUPTHER

## 2018-01-29 RX ORDER — PRAVASTATIN SODIUM 40 MG/1
40 TABLET ORAL
Qty: 90 TAB | Refills: 3 | Status: SHIPPED | OUTPATIENT
Start: 2018-01-29 | End: 2018-10-12 | Stop reason: SDUPTHER

## 2018-01-29 RX ORDER — ALBUTEROL SULFATE 90 UG/1
2 AEROSOL, METERED RESPIRATORY (INHALATION)
Qty: 1 INHALER | Refills: 1 | Status: SHIPPED | OUTPATIENT
Start: 2018-01-29 | End: 2018-10-12 | Stop reason: SDUPTHER

## 2018-01-29 RX ORDER — PREDNISONE 20 MG/1
20 TABLET ORAL
Qty: 5 TAB | Refills: 0 | Status: SHIPPED | OUTPATIENT
Start: 2018-01-29 | End: 2018-05-22

## 2018-01-29 NOTE — LETTER
1/29/2018 6:34 PM 
 
Ms. Jessica Sue 
[address] [facility] [phone] Body mass index is 42.8 kg/(m^2). Focus on regular exercise (150 minutes each week) and healthy eating. Eat more fruits and vegetables. Eat more protein (egg whites, beans, and nuts you know you tolerate) and less carbohydrates (white bread, white rice, white pasta, white potatoes, sodas, and sweets). Eat appropriately small portion sizes. WELLNESS exam soon Sincerely, Ammy Lewis MD

## 2018-01-29 NOTE — PROGRESS NOTES
Chief Complaint   Patient presents with    Sinus Infection     sob, productive coughing, congestion in lungs. Started last night. No fever     1. Have you been to the ER, urgent care clinic since your last visit? Hospitalized since your last visit? No    2. Have you seen or consulted any other health care providers outside of the [address] [place] [address] since your last visit? Include any pap smears or colon screening.  No

## 2018-01-29 NOTE — MR AVS SNAPSHOT
2100 [facility] [address] [place] [address] 
[phone] Patient: Alek Vidal MRN: [id number] Visit Information Date & Time Provider Department Dept. Phone Encounter #  
 1/29/2018  6:15 PM Joaquin Beatty MD [facility] [phone] [id number] Your Appointments 8/30/2018 10:00 AM  
ESTABLISHED PATIENT with Misa Munoz MD  
[facility] ([facility]ON[facility] SCHEDULING) Appt Note: annual  
 354 [facility] [place] [phone] [facility]  
54 [facility] [place] [address] [address] [place] [address] Upcoming Health Maintenance Date Due FOBT Q 1 YEAR AGE 50-75 3/28/2018* MEDICARE YEARLY EXAM 3/29/2018 GLAUCOMA SCREENING Q2Y 3/28/2019 BREAST CANCER SCRN MAMMOGRAM 4/7/2019 DTaP/Tdap/Td series (2 - Td) 3/12/2025 *Topic was postponed. The date shown is not the original due date. Allergies as of 1/29/2018  Review Complete On: 1/29/2018 By: Joaquin Beatty MD  
  
 Severity Noted Reaction Type Reactions Keflex [Cephalexin]  07/27/2015   Systemic Hives Penicillins  03/24/2016    Hives Sulfa (Sulfonamide Antibiotics)  04/07/2015    Unknown (comments) Patient doesn't know or remember Current Immunizations  Reviewed on 10/27/2017 Name Date Influenza Vaccine 10/12/2017, 9/1/2016, 9/28/2014 Pneumococcal Conjugate (PCV-13) 3/12/2015 Pneumococcal Vaccine (Unspecified Type) 8/1/2016 Tdap 3/12/2015 Not reviewed this visit You Were Diagnosed With   
  
 Codes Comments Cough    -  Primary ICD-10-CM: J23 ICD-9-CM: 786.2 BMI 40.0-44.9, adult [facility])     ICD-10-CM: Z68.41 
ICD-9-CM: V85.41 Essential hypertension with goal blood pressure less than 140/90     ICD-10-CM: I10 
ICD-9-CM: 401. 9 Wheezing     ICD-10-CM: R06.2 ICD-9-CM: 786.07   
 Asthmatic bronchitis, mild persistent, with acute exacerbation     ICD-10-CM: J45.31 
ICD-9-CM: 493.92 Essential hypertension     ICD-10-CM: I10 
ICD-9-CM: 401.9 Lipid disorder     ICD-10-CM: E78.9 ICD-9-CM: 272.9 Screening for diabetes mellitus     ICD-10-CM: Z13.1 ICD-9-CM: V77.1 Elevated glucose     ICD-10-CM: R73.09 
ICD-9-CM: 790.29 Fatigue, unspecified type     ICD-10-CM: R53.83 ICD-9-CM: 780.79 Low iron     ICD-10-CM: E61.1 ICD-9-CM: 280.9 Screening for obesity     ICD-10-CM: Z13.89 ICD-9-CM: V77.8 Screening for depression     ICD-10-CM: Z13.89 ICD-9-CM: V79.0 Bronchitis     ICD-10-CM: J40 ICD-9-CM: 252 Vitals BP Pulse Temp Resp Height(growth percentile) Weight(growth percentile) (!) 171/125 (BP 1 Location: Right arm, BP Patient Position: Sitting) 82 99 °F (37.2 °C) (Oral) 16 5' 2\" (1.575 m) 234 lb (106.1 kg) LMP SpO2 BMI OB Status Smoking Status 01/28/2000 96% 42.8 kg/m2 Postmenopausal Former Smoker Vitals History BMI and BSA Data Body Mass Index Body Surface Area  
 42.8 kg/m 2 2.15 m 2 Preferred Pharmacy Pharmacy Name Phone 109 [facility] [phone] Your Updated Medication List  
  
   
This list is accurate as of: 1/29/18  6:44 PM.  Always use your most recent med list.  
  
  
  
  
 albuterol 90 mcg/actuation inhaler Commonly known as:  PROVENTIL HFA, VENTOLIN HFA, PROAIR HFA Take 2 Puffs by inhalation every six (6) hours as needed for Wheezing. amLODIPine 10 mg tablet Commonly known as:  Alona Alfredo Take 1 Tab by mouth daily. aspirin delayed-release 81 mg tablet Take  by mouth daily. azithromycin 250 mg tablet Commonly known as:  Cheng Llamas Take 2 tablets today, then take 1 tablet daily  
  
 calcium carbonate 200 mg calcium (500 mg) Chew Commonly known as:  TUMS Take 1 Tab by mouth as needed. docusate sodium 100 mg capsule Commonly known as:  Melissa  Take 100 mg by mouth two (2) times a day. ferrous sulfate 325 mg (65 mg iron) tablet Commonly known as:  Iron (Ferrous Sulfate) Take 1 Tab by mouth Daily (before breakfast). pravastatin 40 mg tablet Commonly known as:  PRAVACHOL Take 1 Tab by mouth nightly. predniSONE 20 mg tablet Commonly known as:  Angelia Bora Take 1 Tab by mouth daily (with breakfast). VITAMIN D3 2,000 unit Tab Generic drug:  cholecalciferol (vitamin D3) Take  by mouth daily. Prescriptions Printed Refills  
 albuterol (PROVENTIL HFA, VENTOLIN HFA, PROAIR HFA) 90 mcg/actuation inhaler 1 Sig: Take 2 Puffs by inhalation every six (6) hours as needed for Wheezing. Class: Print Route: Inhalation  
 amLODIPine (NORVASC) 10 mg tablet 3 Sig: Take 1 Tab by mouth daily. Class: Print Route: Oral  
 pravastatin (PRAVACHOL) 40 mg tablet 3 Sig: Take 1 Tab by mouth nightly. Class: Print Route: Oral  
 ferrous sulfate (IRON, FERROUS SULFATE,) 325 mg (65 mg iron) tablet 0 Sig: Take 1 Tab by mouth Daily (before breakfast). Class: Print Route: Oral  
 azithromycin (ZITHROMAX) 250 mg tablet 0 Sig: Take 2 tablets today, then take 1 tablet daily Class: Print  
 predniSONE (DELTASONE) 20 mg tablet 0 Sig: Take 1 Tab by mouth daily (with breakfast). Class: Print Route: Oral  
  
We Performed the Following AMB POC RAPID INFLUENZA TEST [76388 CPT(R)] AMB POC RAPID STREP A [74808 CPT(R)] HEMOGLOBIN A1C WITH EAG [11392 CPT(R)] HGB & HCT [28775 CPT(R)] IRON [id number] CPT(R)] 47604 MyCube [ [facility]] TSH 3RD GENERATION [52879 CPT(R)] To-Do List   
 01/29/2018 Imaging:  XR CHEST PA LAT Patient Instructions Take zithromax and prednisone with food Chest xray today Use mucinex for cough If worse go to ER Refills printed Labs soon WELLNESS exam soon Introducing [facility] & HEALTH SERVICES! Dear Gabby Silverman: Thank you for requesting a [employer] account. Our records indicate that you already have an active [employer] Exchange account. You can access your account anytime at [email or web address] Did you know that you can access your hospital and ER discharge instructions at any time in [employer]ia Digital Exchange? You can also review all of your test results from your hospital stay or ER visit. Additional Information If you have questions, please visit the Frequently Asked Questions section of the Zenovia Digital Exchange website at [email or web address] Remember, [employer] Exchange is NOT to be used for urgent needs. For medical emergencies, dial 911. Now available from your iPhone and Android! Please provide this summary of care documentation to your next provider. Your primary care clinician is listed as Genet Marmolejo. If you have any questions after today's visit, please call [phone].

## 2018-01-29 NOTE — PATIENT INSTRUCTIONS
Take zithromax and prednisone with food    Chest xray today    Use mucinex for cough    If worse go to ER    Refills printed    Labs soon    [facility] exam soon

## 2018-01-30 NOTE — PROGRESS NOTES
Kaitlin Douglas is a 69 y.o. female      Issues discussed today include:      Signs and symptoms:  Cough producing yellow sputum  Duration: one week  Context:  +exposures  Location:  Head and chest  Quality:  congestion  Severity:  Preventing rest  Timing:  now  Modifying factors:  No fevers    Needs refills    Data reviewed or ordered today:  Labs soon    Other problems include:  Patient Active Problem List   Diagnosis Code    History of normal resting EKG [id number]    CVD (cerebrovascular disease) I67.9    S/P colonoscopy Z98.890    GERD (gastroesophageal reflux disease) K21.9    [place] (hard of hearing) H91.90    Migraine G43.909    Healthcare maintenance Z00.00    Obesity, Class II, BMI 35-39.9, with comorbidity E66.9    Osteoarthritis (arthritis due to wear and tear of joints) M19.90    DDD (degenerative disc disease), lumbar M51.36    Wheezing R06.2    Essential hypertension with goal blood pressure less than 140/90 I10    Pure hypercholesterolemia E78.00    Bilateral carotid artery stenosis I65.23    History of cervical dysplasia Z87.410    Lymphedema I89.0    Obesity, morbid (HCC) E66.01       Medications:  Current Outpatient Prescriptions   Medication Sig Dispense Refill    albuterol (PROVENTIL HFA, VENTOLIN HFA, PROAIR HFA) 90 mcg/actuation inhaler Take 2 Puffs by inhalation every six (6) hours as needed for Wheezing. 1 Inhaler 1    amLODIPine (NORVASC) 10 mg tablet Take 1 Tab by mouth daily. 90 Tab 3    pravastatin (PRAVACHOL) 40 mg tablet Take 1 Tab by mouth nightly. 90 Tab 3    ferrous sulfate (IRON, FERROUS SULFATE,) 325 mg (65 mg iron) tablet Take 1 Tab by mouth Daily (before breakfast). 90 Tab 0    azithromycin (ZITHROMAX) 250 mg tablet Take 2 tablets today, then take 1 tablet daily 6 Tab 0    predniSONE (DELTASONE) 20 mg tablet Take 1 Tab by mouth daily (with breakfast). 5 Tab 0    lincomycin (LINCOCIN) 300 mg/mL injection 1 mL by IntraMUSCular route once for 1 dose.  1 Vial 0    aspirin delayed-release 81 mg tablet Take  by mouth daily.  calcium carbonate (TUMS) 200 mg calcium (500 mg) chew Take 1 Tab by mouth as needed.  docusate sodium (COLACE) 100 mg capsule Take 100 mg by mouth two (2) times a day.  cholecalciferol, vitamin D3, (VITAMIN D3) 2,000 unit tab Take  by mouth daily. Allergies: Allergies   Allergen Reactions    Keflex [Cephalexin] Hives    Penicillins Hives    Sulfa (Sulfonamide Antibiotics) Unknown (comments)     Patient doesn't know or remember        LMP:  Patient's last menstrual period was 01/28/2000. Social History     Social History    Marital status:      Spouse name: N/A    Number of children: N/A    Years of education: N/A     Occupational History    Not on file. Social History Main Topics    Smoking status: Former Smoker     Quit date: 1/28/1985    Smokeless tobacco: Never Used    Alcohol use No    Drug use: No    Sexual activity: Yes     Partners: Male     Other Topics Concern    Not on file     Social History Narrative         Family History   Problem Relation Age of Onset    Hypertension Father     Heart Disease Father     Diabetes Father     Heart Disease Mother     COPD Sister          Meaningful use:  done      ROS:  Headaches:  no  Chest Pain:  no  SOB:  no  Fevers:  no  Falls:  no  anxiety/depression/losing interest in doing things that were previously enjoyed:  no. PHQ2 = 0  Other significant ROS:      Patient's last menstrual period was 01/28/2000.     Physical Exam  Visit Vitals    /79 (BP 1 Location: Right arm, BP Patient Position: Sitting)    Pulse 82    Temp 99 °F (37.2 °C) (Oral)    Resp 16    Ht 5' 2\" (1.575 m)    Wt 234 lb (106.1 kg)    LMP 01/28/2000    SpO2 96%    BMI 42.8 kg/m2     BP Readings from Last 3 Encounters:   01/29/18 135/79   08/29/17 130/70   03/28/17 136/79     Constitutional:  Appears well,  No Acute Distress, Vitals noted  Psychiatric:   Affect normal, Alert and cooperative, Oriented to person/place/time    Eyes:   Pupils equally round and reactive, EOMI, conjunctiva clear, eyelids normal  ENT:   External ears and nose normal/lips, teeth=OK/gums normal, TMs and Orophyarynx normal  Neck:   general inspection and Thyroid normal.  No abnormal cervical or supraclavicular nodes    Lungs:  +rhonchi and wheezing to auscultation, good respiratory effort  Heart: Ausculation normal.  Regular rhythm. No cardiac murmurs. No carotid bruits or palpable thrills  Chest wall normal  Abd:  benign  Extremities:   without edema, good peripheral pulses  Skin:   Warm to palpation, without rashes, bruising, or suspicious lesions           Assessment:    Patient Active Problem List   Diagnosis Code    History of normal resting EKG [id number]    CVD (cerebrovascular disease) I67.9    S/P colonoscopy Z98.890    GERD (gastroesophageal reflux disease) K21.9    [place] (hard of hearing) H91.90    Migraine G43.909   [address] maintenance Z00.00    Obesity, Class II, BMI 35-39.9, with comorbidity E66.9    Osteoarthritis (arthritis due to wear and tear of joints) M19.90    DDD (degenerative disc disease), lumbar M51.36    Wheezing R06.2    Essential hypertension with goal blood pressure less than 140/90 I10    Pure hypercholesterolemia E78.00    Bilateral carotid artery stenosis I65.23    History of cervical dysplasia Z87.410    Lymphedema I89.0    Obesity, morbid (HCC) E66.01       Today's diagnoses are:    ICD-10-CM ICD-9-CM    1. Asthmatic bronchitis, mild persistent, with acute exacerbation J45.31 493.92 albuterol (PROVENTIL HFA, VENTOLIN HFA, PROAIR HFA) 90 mcg/actuation inhaler      azithromycin (ZITHROMAX) 250 mg tablet      predniSONE (DELTASONE) 20 mg tablet      lincomycin (LINCOCIN) 300 mg/mL injection   2. Cough R05 786.2 AMB POC RAPID STREP A      AMB POC SAMANTHA INFLUENZA A/B TEST      CANCELED: AMB POC RAPID INFLUENZA TEST   3.  BMI 40.0-44.9, adult ([facility]) Z68.41 V85.41 HEMOGLOBIN A1C WITH EAG   4. Essential hypertension with goal blood pressure less than 140/90 I10 401.9     135/79 recheck   5. Wheezing R06.2 786.07 XR CHEST PA LAT   6. Essential hypertension I10 401.9 amLODIPine (NORVASC) 10 mg tablet   7. Lipid disorder E78.9 272.9 pravastatin (PRAVACHOL) 40 mg tablet   8. Screening for diabetes mellitus Z13.1 V77.1 HEMOGLOBIN A1C WITH EAG   9. Elevated glucose R73.09 790.29 HEMOGLOBIN A1C WITH EAG   10. Fatigue, unspecified type R53.83 780.79 TSH 3RD GENERATION      HGB & HCT      IRON   11. Low iron E61.1 280.9 IRON      ferrous sulfate (IRON, FERROUS SULFATE,) 325 mg (65 mg iron) tablet   12. Screening for obesity Z13.89 V77.8    13. Screening for depression Z13.89 V79.0 [id number] MissingLINK   14. Bronchitis J40 490    15. [facility] E66.01 278.01        Plan:  Orders Placed This Encounter    XR CHEST PA LAT     Standing Status:   Future     Number of Occurrences:   1     Standing Expiration Date:   2/28/2019     Order Specific Question:   Reason for Exam     Answer:   cough    HEMOGLOBIN A1C WITH EAG    TSH 3RD GENERATION    HGB & HCT    IRON    AMB POC RAPID STREP A    AMB POC SAMANTHA INFLUENZA A/B TEST    OR DEPRESSION SCREEN ANNUAL    albuterol (PROVENTIL HFA, VENTOLIN HFA, PROAIR HFA) 90 mcg/actuation inhaler     Sig: Take 2 Puffs by inhalation every six (6) hours as needed for Wheezing. Dispense:  1 Inhaler     Refill:  1    amLODIPine (NORVASC) 10 mg tablet     Sig: Take 1 Tab by mouth daily. Dispense:  90 Tab     Refill:  3    pravastatin (PRAVACHOL) 40 mg tablet     Sig: Take 1 Tab by mouth nightly. Dispense:  90 Tab     Refill:  3    ferrous sulfate (IRON, FERROUS SULFATE,) 325 mg (65 mg iron) tablet     Sig: Take 1 Tab by mouth Daily (before breakfast).      Dispense:  90 Tab     Refill:  0    azithromycin (ZITHROMAX) 250 mg tablet     Sig: Take 2 tablets today, then take 1 tablet daily     Dispense:  6 Tab     Refill:  0    predniSONE (DELTASONE) 20 mg tablet     Sig: Take 1 Tab by mouth daily (with breakfast). Dispense:  5 Tab     Refill:  0    lincomycin (LINCOCIN) 300 mg/mL injection     Si mL by IntraMUSCular route once for 1 dose. Dispense:  1 Vial     Refill:  0       See patient instructions  Patient Instructions   Take zithromax and prednisone with food    Chest xray today    Use mucinex for cough    If worse go to ER    Refills printed    Labs soon    WELLNESS exam soon        refresh note:  done    AVS Printed:  done      Diagnoses and all orders for this visit:    1. Asthmatic bronchitis, mild persistent, with acute exacerbation  -     albuterol (PROVENTIL HFA, VENTOLIN HFA, PROAIR HFA) 90 mcg/actuation inhaler; Take 2 Puffs by inhalation every six (6) hours as needed for Wheezing.  -     azithromycin (ZITHROMAX) 250 mg tablet; Take 2 tablets today, then take 1 tablet daily  -     predniSONE (DELTASONE) 20 mg tablet; Take 1 Tab by mouth daily (with breakfast). -     lincomycin (LINCOCIN) 300 mg/mL injection; 1 mL by IntraMUSCular route once for 1 dose. 2. Cough  -     AMB POC RAPID STREP A  -     AMB POC SAMANTHA INFLUENZA A/B TEST    3. BMI 40.0-44.9, adult (HCC)  -     HEMOGLOBIN A1C WITH EAG    4. Essential hypertension with goal blood pressure less than 140/90  Comments:  135/79 recheck    5. Wheezing  -     XR CHEST PA LAT; Future    6. Essential hypertension  -     amLODIPine (NORVASC) 10 mg tablet; Take 1 Tab by mouth daily. 7. Lipid disorder  -     pravastatin (PRAVACHOL) 40 mg tablet; Take 1 Tab by mouth nightly. 8. Screening for diabetes mellitus  -     HEMOGLOBIN A1C WITH EAG    9. Elevated glucose  -     HEMOGLOBIN A1C WITH EAG    10. Fatigue, unspecified type  -     TSH 3RD GENERATION  -     HGB & HCT  -     IRON    11. Low iron  -     IRON  -     ferrous sulfate (IRON, FERROUS SULFATE,) 325 mg (65 mg iron) tablet; Take 1 Tab by mouth Daily (before breakfast).     12. Screening for obesity    13. Screening for depression  -     AK DEPRESSION SCREEN ANNUAL    14. Bronchitis    15. Obesity, morbid ([address]  Assessment & Plan:  Uncontrolled, based on history, physical exam and review of pertinent labs, studies and medications; meds reconciled; continue current treatment plan, lifestyle modifications recommended. see letter 1/29/2018  Key Obesity Meds     Patient is on no anti-obesity meds.         Lab Results   Component Value Date/Time    Glucose 97 04/04/2017 08:10 AM    Cholesterol, total 159 04/04/2017 08:10 AM    HDL Cholesterol 70 04/04/2017 08:10 AM    LDL,Direct 80 04/04/2017 08:10 AM    TSH 4.330 04/04/2017 08:10 AM    Sodium 143 04/04/2017 08:10 AM    Potassium 5.0 04/04/2017 08:10 AM    ALT (SGPT) 22 04/04/2017 08:10 AM    AST (SGOT) 24 04/04/2017 08:10 AM

## 2018-01-30 NOTE — ASSESSMENT & PLAN NOTE
Uncontrolled, based on history, physical exam and review of pertinent labs, studies and medications; meds reconciled; continue current treatment plan, lifestyle modifications recommended. see letter 1/29/2018  Key Obesity Meds     Patient is on no anti-obesity meds.         Lab Results   Component Value Date/Time    Glucose 97 04/04/2017 08:10 AM    Cholesterol, total 159 04/04/2017 08:10 AM    HDL Cholesterol 70 04/04/2017 08:10 AM    LDL,Direct 80 04/04/2017 08:10 AM    TSH 4.330 04/04/2017 08:10 AM    Sodium 143 04/04/2017 08:10 AM    Potassium 5.0 04/04/2017 08:10 AM    ALT (SGPT) 22 04/04/2017 08:10 AM    AST (SGOT) 24 04/04/2017 08:10 AM

## 2018-04-03 ENCOUNTER — OFFICE VISIT (OUTPATIENT)
Dept: FAMILY MEDICINE CLINIC | Age: 68
End: 2018-04-03

## 2018-04-03 ENCOUNTER — HOSPITAL ENCOUNTER (OUTPATIENT)
Dept: LAB | Age: 68
Discharge: HOME | End: 2018-04-03
Payer: MEDICARE

## 2018-04-03 VITALS
HEIGHT: 62 IN | RESPIRATION RATE: 17 BRPM | WEIGHT: 236 LBS | HEART RATE: 69 BPM | TEMPERATURE: 97.6 F | SYSTOLIC BLOOD PRESSURE: 139 MMHG | BODY MASS INDEX: 43.43 KG/M2 | OXYGEN SATURATION: 99 % | DIASTOLIC BLOOD PRESSURE: 69 MMHG

## 2018-04-03 DIAGNOSIS — M89.9 DISORDER OF BONE AND CARTILAGE: ICD-10-CM

## 2018-04-03 DIAGNOSIS — L85.8 CUTANEOUS HORN: ICD-10-CM

## 2018-04-03 DIAGNOSIS — Z13.1 SCREENING FOR DIABETES MELLITUS: ICD-10-CM

## 2018-04-03 DIAGNOSIS — E61.1 LOW IRON: ICD-10-CM

## 2018-04-03 DIAGNOSIS — Z13.5 GLAUCOMA SCREENING: ICD-10-CM

## 2018-04-03 DIAGNOSIS — E78.00 PURE HYPERCHOLESTEROLEMIA: ICD-10-CM

## 2018-04-03 DIAGNOSIS — M94.9 DISORDER OF BONE AND CARTILAGE: ICD-10-CM

## 2018-04-03 DIAGNOSIS — R73.09 ELEVATED GLUCOSE: ICD-10-CM

## 2018-04-03 DIAGNOSIS — Z00.00 MEDICARE ANNUAL WELLNESS VISIT, SUBSEQUENT: Primary | ICD-10-CM

## 2018-04-03 DIAGNOSIS — M85.80 OSTEOPENIA, UNSPECIFIED LOCATION: ICD-10-CM

## 2018-04-03 DIAGNOSIS — Z13.39 SCREENING FOR ALCOHOLISM: ICD-10-CM

## 2018-04-03 DIAGNOSIS — Z23 NEED FOR SHINGLES VACCINE: ICD-10-CM

## 2018-04-03 DIAGNOSIS — I10 ESSENTIAL HYPERTENSION WITH GOAL BLOOD PRESSURE LESS THAN 140/90: ICD-10-CM

## 2018-04-03 DIAGNOSIS — S76.219A GROIN STRAIN, INITIAL ENCOUNTER: ICD-10-CM

## 2018-04-03 DIAGNOSIS — Z13.31 SCREENING FOR DEPRESSION: ICD-10-CM

## 2018-04-03 PROCEDURE — 85027 COMPLETE CBC AUTOMATED: CPT

## 2018-04-03 PROCEDURE — 80053 COMPREHEN METABOLIC PANEL: CPT

## 2018-04-03 PROCEDURE — 82728 ASSAY OF FERRITIN: CPT

## 2018-04-03 PROCEDURE — 83036 HEMOGLOBIN GLYCOSYLATED A1C: CPT

## 2018-04-03 PROCEDURE — 80061 LIPID PANEL: CPT

## 2018-04-03 PROCEDURE — 36415 COLL VENOUS BLD VENIPUNCTURE: CPT

## 2018-04-03 PROCEDURE — 84443 ASSAY THYROID STIM HORMONE: CPT

## 2018-04-03 RX ORDER — LANOLIN ALCOHOL/MO/W.PET/CERES
325 CREAM (GRAM) TOPICAL
Qty: 90 TAB | Refills: 0 | Status: SHIPPED | OUTPATIENT
Start: 2018-04-03 | End: 2018-10-12 | Stop reason: SDUPTHER

## 2018-04-03 NOTE — MR AVS SNAPSHOT
[address] 
[phone] Patient: Raghu Baugh MRN: [id number] Ukrainian:[id number]/83/[id number] Visit Information Date & Time Provider Department Dept. Phone Encounter #  
 4/3/2018 10:00 AM Buck Stanton, [facility] [phone] [id number] Follow-up Instructions Return in about 1 year (around 4/3/2019). Your Appointments 8/30/2018 10:00 AM  
ESTABLISHED PATIENT with Shawn Acevedo MD  
[facility] (JONO SCHEDULING) Appt Note: annual  
 [address] [facility] [address]  
[address] [facility] [place] [address] [address] Upcoming Health Maintenance Date Due FOBT Q 1 YEAR AGE 50-75 3/28/2017 MEDICARE YEARLY EXAM 3/29/2018 GLAUCOMA SCREENING Q2Y 3/28/2019 BREAST CANCER SCRN MAMMOGRAM 4/7/2019 DTaP/Tdap/Td series (2 - Td) 3/12/2025 Allergies as of 4/3/2018  Review Complete On: 4/3/2018 By: Buck Stanton MD  
  
 Severity Noted Reaction Type Reactions Keflex [Cephalexin]  07/27/2015   Systemic Hives Penicillins  03/24/2016    Hives Sulfa (Sulfonamide Antibiotics)  04/07/2015    Unknown (comments) Patient doesn't know or remember Current Immunizations  Reviewed on 10/27/2017 Name Date Influenza Vaccine 10/12/2017, 9/1/2016, 9/28/2014 Pneumococcal Conjugate (PCV-13) 3/12/2015 Pneumococcal Vaccine (Unspecified Type) 8/1/2016 Tdap 3/12/2015 Not reviewed this visit You Were Diagnosed With   
  
 Codes Comments Medicare annual wellness visit, subsequent    -  Primary ICD-10-CM: Z00.00 ICD-9-CM: V70.0 Low iron     ICD-10-CM: E61.1 ICD-9-CM: 280.9 Osteopenia, unspecified location     ICD-10-CM: M85.80 ICD-9-CM: 733.90 Screening for alcoholism     ICD-10-CM: Z13.89 ICD-9-CM: V79.1 Screening for depression     ICD-10-CM: Z13.89 ICD-9-CM: V79.0 Disorder of bone and cartilage     ICD-10-CM: M89.9, M94.9 ICD-9-CM: 733.90 Groin strain, initial encounter     ICD-10-CM: X03.947H 
ICD-9-CM: 773. 8 Cutaneous horn     ICD-10-CM: L85.8 ICD-9-CM: 702.8 Need for shingles vaccine     ICD-10-CM: K43 ICD-9-CM: V04.89 Essential hypertension with goal blood pressure less than 140/90     ICD-10-CM: I10 
ICD-9-CM: 401.9 Pure hypercholesterolemia     ICD-10-CM: E78.00 ICD-9-CM: 272.0 Glaucoma screening     ICD-10-CM: Z13.5 ICD-9-CM: V80.1 Vitals BP Pulse Temp Resp Height(growth percentile) Weight(growth percentile) 139/69 69 97.6 °F (36.4 °C) (Oral) 17 5' 2\" (1.575 m) 236 lb (107 kg) LMP SpO2 BMI OB Status Smoking Status 01/28/2000 99% 43.16 kg/m2 Postmenopausal Former Smoker Vitals History BMI and BSA Data Body Mass Index Body Surface Area  
 43.16 kg/m 2 2.16 m 2 Preferred Pharmacy Pharmacy Name Phone [address] [place] [address] [phone] Your Updated Medication List  
  
   
This list is accurate as of 4/3/18 11:05 AM.  Always use your most recent med list.  
  
  
  
  
 albuterol 90 mcg/actuation inhaler Commonly known as:  PROVENTIL HFA, VENTOLIN HFA, PROAIR HFA Take 2 Puffs by inhalation every six (6) hours as needed for Wheezing. amLODIPine 10 mg tablet Commonly known as:  Jv Rings Take 1 Tab by mouth daily. aspirin delayed-release 81 mg tablet Take  by mouth daily. calcium carbonate 200 mg calcium (500 mg) Chew Commonly known as:  TUMS Take 1 Tab by mouth as needed. docusate sodium 100 mg capsule Commonly known as:  Kike Pian Take 100 mg by mouth two (2) times a day. ferrous sulfate 325 mg (65 mg iron) tablet Commonly known as:  Iron (Ferrous Sulfate) Take 1 Tab by mouth Daily (before breakfast). pravastatin 40 mg tablet Commonly known as:  PRAVACHOL Take 1 Tab by mouth nightly. predniSONE 20 mg tablet Commonly known as:  Kwesi Res Take 1 Tab by mouth daily (with breakfast). varicella-zoster recombinant (PF) 50 mcg/0.5 mL Susr injection Commonly known as:  SHINGRIX (PF)  
0.5 mL by IntraMUSCular route once for 1 dose. VITAMIN D3 2,000 unit Tab Generic drug:  cholecalciferol (vitamin D3) Take  by mouth daily. Prescriptions Printed Refills  
 ferrous sulfate (IRON, FERROUS SULFATE,) 325 mg (65 mg iron) tablet 0 Sig: Take 1 Tab by mouth Daily (before breakfast). Class: Print Route: Oral  
 varicella-zoster recombinant, PF, (SHINGRIX, PF,) 50 mcg/0.5 mL susr injection 0 Si.5 mL by IntraMUSCular route once for 1 dose. Class: Print Route: IntraMUSCular We Performed the Following CBC W/O DIFF [42606 CPT(R)] [address] [[id number] [facility]] FERRITIN [00499 CPT(R)] LIPID PANEL [86407 CPT(R)] METABOLIC PANEL, COMPREHENSIVE [86051 CPT(R)] GA ANNUAL ALCOHOL SCREEN 15 MIN [id number] [facility]] REFERRAL TO OPHTHALMOLOGY [REF57 Custom] Follow-up Instructions Return in about 1 year (around 4/3/2019). To-Do List   
 2018 Imaging:  DEXA BONE DENSITY STUDY AXIAL   
  
 04/10/2018 9:30 AM  
  Appointment with [address] [place] [address] at [facility] ([phone]) Shower or bathe using soap and water. Do not use deodorant, powder, perfumes, or lotion the day of your exam.  If your prior mammograms were not performed at [facility] 6 please bring films with you or forward prior images 2 days before your procedure. Check in at registration 15min before your appointment time unless you were instructed to do otherwise. A script is not necessary, but if you have one, please bring it on the day of the mammogram or have it faxed to the department. [facility] [phone] [facility]  [phone] [facility] [address] CARISA  [phone] [facility] [phone] [facility] 305[address] [facility] Levophu Dial [phone] Referral Information Referral ID Referred By Referred To  
  
 [id number] DELIA, [address] [facility] [address] Sushila Cherry 33 Visits Status Start Date End Date 1 New Request 4/3/18 4/3/19 If your referral has a status of pending review or denied, additional information will be sent to support the outcome of this decision. Patient Instructions Medicare Wellness Visit, Female The best way to live healthy is to have a healthy lifestyle by eating a well-balanced diet, exercising regularly, limiting alcohol and stopping smoking. Regular physical exams and screening tests are another way to keep healthy. Preventive exams provided by your health care provider can find health problems before they become diseases or illnesses. Preventive services including immunizations, screening tests, monitoring and exams can help you take care of your own health. All people over age 72 should have a pneumovax  and and a prevnar shot to prevent pneumonia. These are once in a lifetime unless you and your provider decide differently. All people over 65 should have a yearly flu shot and a tetanus vaccine every 10 years. A bone mass density to screen for osteoporosis or thinning of the bones should be done every 2 years after 65. Screening for diabetes mellitus with a blood sugar test should be done every year. Glaucoma is a disease of the eye due to increased ocular pressure that can lead to blindness and it should be done every year by an eye professional. 
 
Cardiovascular screening tests that check for elevated lipids (fatty part of blood) which can lead to heart disease and strokes should be done every 5 years.  
 
Colorectal screening that evaluates for blood or polyps in your colon should be done yearly as a stool test or every five years as a flexible sigmoidoscope or every 10 years as a colonoscopy up to age 76. Breast cancer screening with a mammogram is recommended biennially  for women age 54-69. Screening for cervical cancer with a pap smear and pelvic exam is recommended for women after age 72 years every 2 years up to age 79 or when the provider and patient decide to stop. If there is a history of cervical abnormalities or other increased risk for cancer then the test is recommended yearly. Hepatitis C screening is also recommended for anyone born between 80 through [address]. A shingles vaccine is also recommended once in a lifetime after age 61. Your Medicare Wellness Exam is recommended annually. Here is a list of your current Health Maintenance items with a due date: 
Health Maintenance Due Topic Date Due  Stool testing for trace blood  03/28/2017 [address] [place] [address] Annual Well Visit  03/29/2018 Advance Care Planning: Care Instructions Your Care Instructions It can be hard to live with an illness that cannot be cured. But if your health is getting worse, you may want to make decisions about end-of-life care. Planning for the end of your life does not mean that you are giving up. It is a way to make sure that your wishes are met. Clearly stating your wishes can make it easier for your loved ones. Making plans while you are still able may also ease your mind and make your final days less stressful and more meaningful. Follow-up care is a key part of your treatment and safety. Be sure to make and go to all appointments, and call your doctor if you are having problems. It's also a good idea to know your test results and keep a list of the medicines you take. What can you do to plan for the end of life? · You can bring these issues up with your doctor. You do not need to wait until your doctor starts the conversation.  You might start with \"I would not be willing to live with . [place] \" When you complete this sentence it helps your doctor understand your wishes. · Talk openly and honestly with your doctor. This is the best way to understand the decisions you will need to make as your health changes. Know that you can always change your mind. · Ask your doctor about commonly used life-support measures. These include tube feedings, breathing machines, and fluids given through a vein (IV). Understanding these treatments will help you decide whether you want them. · You may choose to have these life-supporting treatments for a limited time. This allows a trial period to see whether they will help you. You may also decide that you want your doctor to take only certain measures to keep you alive. It is important to spell out these conditions so that your doctor and family understand them. · Talk to your doctor about how long you are likely to live. He or she may be able to give you an idea of what usually happens with your specific illness. · Think about preparing papers that state your wishes. This way there will not be any confusion about what you want. You can change your instructions at any time. Which papers should you prepare? Advance directives are legal papers that tell doctors how you want to be cared for at the end of your life. You do not need a  to write these papers. Ask your doctor or your state health department for information on how to write your advance directives. They may have the forms for each of these types of papers. Make sure your doctor has a copy of these on file, and give a copy to a family member or close friend. · Consider a do-not-resuscitate order (DNR). This order asks that no extra treatments be done if your heart stops or you stop breathing. Extra treatments may include cardiopulmonary resuscitation (CPR), electrical shock to restart your heart, or a machine to breathe for you.  If you decide to have a DNR order, ask your doctor to explain and write it. Place the order in your home where everyone can easily see it. · Consider a living will. A living will explains your wishes about life support and other treatments at the end of your life if you become unable to speak for yourself. Living cole tell doctors to use or not use treatments that would keep you alive. You must have one or two witnesses or a notary present when you sign this form. · Consider a durable power of  for health care. This allows you to name a person to make decisions about your care if you are not able to. Most people ask a close friend or family member. Talk to this person about the kinds of treatments you want and those that you do not want. Make sure this person understands your wishes. These legal papers are simple to change. Tell your doctor what you want to change, and ask him or her to make a note in your medical file. Give your family updated copies of the papers. Where can you learn more? Go to http://casandra-tigist.info/. Enter P184 in the search box to learn more about \"Advance Care Planning: Care Instructions. \" Current as of: November 17, 2016 Content Version: 11.3 © 9593-1189 [employer]. Care instructions adapted under license by [employer] (which disclaims liability or warranty for this information). If you have questions about a medical condition or this instruction, always ask your healthcare professional. [place] [address] any warranty or liability for your use of this information. Groin Strain: Care Instructions Your Care Instructions A groin strain is an injury that happens when you tear or overstretch (pull) a groin muscle. The groin muscles are in the area on either side of the body in the folds where the belly joins the legs.  You can strain a groin muscle during exercise, such as running, skating, kicking in soccer, or playing basketball. It can happen when you lift, push, or pull heavy objects. You might pull a groin muscle when you fall. The injury can range from a minor pull to a more serious tear of the muscle. You may feel pain and tenderness that's worse when you squeeze your legs together. You may also have pain when you raise the knee of the injured side. There may be swelling or bruising in the groin area or inner thigh. If you have a bad strain, you may walk with a limp while it heals. Rest and other home care can help the muscle heal. Healing can take up to 3 weeks or more. Your doctor may want to see you again in 2 to 3 weeks. Follow-up care is a key part of your treatment and safety. Be sure to make and go to all appointments, and call your doctor if you are having problems. It's also a good idea to know your test results and keep a list of the medicines you take. How can you care for yourself at home? · Be safe with medicines. Read and follow all instructions on the label. ¨ If the doctor gave you a prescription medicine for pain, take it as prescribed. ¨ If you are not taking a prescription pain medicine, ask your doctor if you can take an over-the-counter medicine. · Rest and protect your injured or sore groin area for 1 to 2 weeks. Stop, change, or take a break from any activity that may be causing your pain or soreness. Do not do intense activities while you still have pain. · Put ice or a cold pack on your groin area for 10 to 20 minutes at a time. Try to do this every 1 to 2 hours for the next 3 days (when you are awake) or until the swelling goes down. Put a thin cloth between the ice and your skin. · After 2 or 3 days, if your swelling is gone, apply heat. Put a warm water bottle, a heating pad set on low, or a warm cloth on your groin area. Do not go to sleep with a heating pad on your skin. · If your doctor gave you crutches, make sure you use them as directed. · Wear snug shorts or underwear that support the injured area. When should you call for help? Call your doctor now or seek immediate medical care if: 
? · You have new or severe pain or swelling in the groin area. ? · Your groin or upper thigh is cool or pale or changes color. ? · You have tingling, weakness, or numbness in your groin or leg. ? · You cannot move your leg. ? · You cannot put weight on your leg. ? Watch closely for changes in your health, and be sure to contact your doctor if: 
? · You do not get better as expected. Where can you learn more? Go to http://casandra-tigist.info/. Enter G952 in the search box to learn more about \"Groin Strain: Care Instructions. \" Current as of: March 21, 2017 Content Version: 11.4 © 2560-2119 [employer]. Care instructions adapted under license by [employer] (which disclaims liability or warranty for this information). If you have questions about a medical condition or this instruction, always ask your healthcare professional. [address] any warranty or liability for your use of this information. Introducing [facility] & HEALTH SERVICES! Dear Abril Valiente: Thank you for requesting a Planet OS account. Our records indicate that you already have an active Planet OS account. You can access your account anytime at [email or web address] Did you know that you can access your hospital and ER discharge instructions at any time in Planet OS? You can also review all of your test results from your hospital stay or ER visit. Additional Information If you have questions, please visit the Frequently Asked Questions section of the Planet OS website at [email or web address] Remember, Planet OS is NOT to be used for urgent needs. For medical emergencies, dial 911. Now available from your iPhone and Android! Please provide this summary of care documentation to your next provider. Your primary care clinician is listed as Ernst Spurling. If you have any questions after today's visit, please call [phone].

## 2018-04-03 NOTE — ACP (ADVANCE CARE PLANNING)
Advance Care Planning (ACP) Provider Conversation Snapshot    Date of ACP Conversation: 04/03/18  Persons included in Conversation:  patient  Length of ACP Conversation in minutes:  <16 minutes (Non-Billable)    Authorized Decision Maker (if patient is incapable of making informed decisions): This person is:  Healthcare Agent/Medical Power of  under Advance Directive. Patient will bring this documentation to be scanned. For Patients with Decision Making Capacity:   Values/Goals: Exploration of values, goals, and preferences if recovery is not expected, even with continued medical treatment in the event of:  Imminent death  Severe, permanent brain injury    Conversation Outcomes / Follow-Up Plan:   Recommended communicating the plan and making copies for the healthcare agent, personal physician, and others as appropriate (e.g., health system)  Recommended review of completed ACP document annually or upon change in health status  advised to bring in advanced directives for review.

## 2018-04-03 NOTE — PROGRESS NOTES
Problem Focused Progress Note    Name: Marcela Spangler Date: 4/3/2018  Ethnicity: NON-  Race: WHITE OR   MRN: [id number]  Age: 77 y.o.  : 1950  Sex: Female       HPI:   Robbie Hollis is a 77y.o. year old female who presents today for AWV, but has several additional concerns. See separate note for AWV. Chief Complaint   Patient presents with    Annual Wellness Visit     subsequent     Iron-deficiency  Has been taking Rx iron instead of OTC. Bottles appear to contain same 325mg Ferrous Sulfate, but feels better since taking the Rx version. Denies significant side-effects, such as constipation. Right thigh pain  Was walking 3 weeks ago and develops acute right anterior and groin pain. Became severe. Was difficult to lift or rotate leg due to the pain. Has gradually improved and is able to ambulate without pain. Every once in a while the pain will return. Denies any particular trauma or abnormal movement that initiated the pain. Current pain scale: Pain Scale: 0 - No pain/10. Skin lesion:   Has skin lesion on the mons pubis. Has been present for many months and has gradually increased in size. Possible location of prior skin sore. Gets caught on clothing. Is putting baby powder on it to keep it dry. Note from Dr. Tomi Kirkpatrick several years ago shows a sebaceous cyst of the labia. Also noted as far back as . Has been gradually getting larger. Cardiovascular Review:  The patient has hypertension, hyperlipidemia, obesity and elevated fasting glucose and bilateral carotid stenosis. .  Diet and Lifestyle: not attempting to follow a low fat, low cholesterol diet  Home BP Monitoring: is not measured at home.   Pertinent ROS: taking medications as instructed, no medication side effects noted, no TIA's, no chest pain on exertion, no dyspnea on exertion, noting swelling of ankles.      Review of Systems   Review of Systems   Constitutional: Positive for malaise/fatigue. Negative for chills, fever and weight loss. HENT: Negative for congestion and sore throat. Eyes: Negative for blurred vision and double vision. Respiratory: Negative for cough, shortness of breath and wheezing. Cardiovascular: Negative for chest pain and palpitations. Gastrointestinal: Negative for abdominal pain, constipation, diarrhea and nausea. Genitourinary: Negative for dysuria, frequency, hematuria and urgency. Musculoskeletal: Positive for back pain. Negative for falls and myalgias. Right thigh pain   Skin: Negative for rash. Skin lesion on mons pubis   Neurological: Negative for dizziness, tremors, weakness and headaches. Endo/Heme/Allergies: Negative for polydipsia. Psychiatric/Behavioral: Negative for depression. The patient is not nervous/anxious. All other systems reviewed and are negative. Physical Examination     Physical Exam   Constitutional: She is oriented to person, place, and time. She appears well-nourished. No distress. HENT:   Mouth/Throat: Oropharynx is clear and moist.   Eyes: No scleral icterus. Neck: Neck supple. No thyromegaly present. Cardiovascular: Normal rate, regular rhythm, normal heart sounds and intact distal pulses. Pulmonary/Chest: Effort normal and breath sounds normal.   Abdominal: Soft. Bowel sounds are normal. She exhibits no distension. There is no tenderness. Musculoskeletal: Normal range of motion. Right hip: She exhibits tenderness. Legs:  Neurological: She is alert and oriented to person, place, and time. Skin:        Psychiatric: She has a normal mood and affect. Vitals reviewed. /69  Pulse 69  Temp 97.6 °F (36.4 °C) (Oral)   Resp 17  Ht 5' 2\" (1.575 m)  Wt 236 lb (107 kg)  LMP 01/28/2000  SpO2 99%  BMI 43.16 kg/m2      Assessment/Plan   9. Low iron  Will check CBC and ferritin.   Discussed taking with vit c and avoiding taking with calcium containing foods (milk) or antacids. - ferrous sulfate (IRON, FERROUS SULFATE,) 325 mg (65 mg iron) tablet; Take 1 Tab by mouth Daily (before breakfast). Dispense: 90 Tab; Refill: 0  - FERRITIN    10. Groin strain, initial encounter  Heat, HEP    11. Cutaneous horn  Return for resection. Will need pathology to r/o SCC    12. Essential hypertension with goal blood pressure less than 140/90  At goal.    Key CAD CHF Meds             amLODIPine (NORVASC) 10 mg tablet  (Taking) Take 1 Tab by mouth daily. pravastatin (PRAVACHOL) 40 mg tablet  (Taking) Take 1 Tab by mouth nightly. aspirin delayed-release 81 mg tablet  (Taking) Take  by mouth daily.        - CBC W/O DIFF  - METABOLIC PANEL, COMPREHENSIVE  - LIPID PANEL  - TSH RFX ON ABNORMAL TO FREE T4    13. Pure hypercholesterolemia  Key Antihyperlipidemia Meds             pravastatin (PRAVACHOL) 40 mg tablet  (Taking) Take 1 Tab by mouth nightly. - LIPID PANEL    14. Elevated glucose  Check A1c.  - HEMOGLOBIN A1C WITH EAG      I have discussed the diagnosis with the patient and the intended plan as seen in the above orders. she has expressed understanding. The patient has received an after-visit summary and questions were answered concerning future plans. I have discussed medication side effects and warnings with the patient as well. Follow-up Disposition:  Return in about 1 year (around 4/3/2019).     Electronically Signed: Jaimee Mcintosh MD

## 2018-04-03 NOTE — PROGRESS NOTES
[address]  [address]  [phone]             Date of visit: 4/3/2018       This is a Subsequent Medicare Annual Wellness Visit providing Personalized Prevention Plan Services (PPPS) (Performed 12 months after initial AWV and PPPS )    I have reviewed the patient's medical history in detail and updated the computerized patient record. History obtained from: spouse and the patient. Concerns today   (Patient understands that medical problems addressed today may incur additional cost as this is a preventive visit)  - see separate note for additional encounter.       Referral for glaucoma and cataract screening  Bone density scan: Osteopenia 5-6 years ago  Shingles shot  Right thigh   Was taking the iron over the   Right thigh pain, 3 weeks, ant thight   Possible groin strain      History     Patient Active Problem List   Diagnosis Code    History of normal resting EKG [id number]    CVD (cerebrovascular disease) I67.9    S/P colonoscopy Z98.890    GERD (gastroesophageal reflux disease) K21.9    [place] (hard of hearing) H91.90    Migraine G43.909   [address] maintenance Z00.00    Obesity, Class II, BMI 35-39.9, with comorbidity E66.9    Osteoarthritis (arthritis due to wear and tear of joints) M19.90    DDD (degenerative disc disease), lumbar M51.36    Wheezing R06.2    Essential hypertension with goal blood pressure less than 140/90 I10    Pure hypercholesterolemia E78.00    Bilateral carotid artery stenosis I65.23    History of cervical dysplasia Z87.410    Lymphedema I89.0    Obesity, morbid (HCC) E66.01     Past Medical History:   Diagnosis Date    Bronchial asthma     Hypercholesterolemia     Hypertension       Past Surgical History:   Procedure Laterality Date    HX BREAST BIOPSY Left 1990s    negative pathology    HX HEENT      tonsilectomy    HX OTHER SURGICAL      cervical cryotherapy x3    HX TUBAL LIGATION Allergies   Allergen Reactions    Keflex [Cephalexin] Hives    Penicillins Hives    Sulfa (Sulfonamide Antibiotics) Unknown (comments)     Patient doesn't know or remember      Current Outpatient Prescriptions   Medication Sig Dispense Refill    albuterol (PROVENTIL HFA, VENTOLIN HFA, PROAIR HFA) 90 mcg/actuation inhaler Take 2 Puffs by inhalation every six (6) hours as needed for Wheezing. 1 Inhaler 1    amLODIPine (NORVASC) 10 mg tablet Take 1 Tab by mouth daily. 90 Tab 3    pravastatin (PRAVACHOL) 40 mg tablet Take 1 Tab by mouth nightly. 90 Tab 3    ferrous sulfate (IRON, FERROUS SULFATE,) 325 mg (65 mg iron) tablet Take 1 Tab by mouth Daily (before breakfast). 90 Tab 0    aspirin delayed-release 81 mg tablet Take  by mouth daily.  calcium carbonate (TUMS) 200 mg calcium (500 mg) chew Take 1 Tab by mouth as needed.  docusate sodium (COLACE) 100 mg capsule Take 100 mg by mouth two (2) times a day.  cholecalciferol, vitamin D3, (VITAMIN D3) 2,000 unit tab Take  by mouth daily.  predniSONE (DELTASONE) 20 mg tablet Take 1 Tab by mouth daily (with breakfast). 5 Tab 0     Family History   Problem Relation Age of Onset    Hypertension Father     Heart Disease Father     Diabetes Father     Heart Disease Mother     COPD Sister      Social History   Substance Use Topics    Smoking status: Former Smoker     Quit date: 1/28/1985    Smokeless tobacco: Never Used    Alcohol use No       Specialists/Care Team   Rene Necessary has established care with the following healthcare providers:    Patient Care Team:  Qasim Sharp MD as PCP - General (Family Practice)  Tasha Díaz MD (Cardiology)    Health Risk Assessment   Survey Reviewed as documented by Jacque Cervantes LPN. Positive results discussed with patient. Demographics   female  77 y.o. WHITE OR     Review of Systems (if indicated for problems addressed today)   See separate note.     Physical Examination     Vitals: 04/03/18 1002   BP: 139/69   Pulse: 69   Resp: 17   Temp: 97.6 °F (36.4 °C)   TempSrc: Oral   SpO2: 99%   Weight: 236 lb (107 kg)   Height: 5' 2\" (1.575 m)     Body mass index is 43.16 kg/(m^2). No exam data present  Was the patient's timed Up & Go test unsteady or longer than 30 seconds? no    Evaluation of Cognitive Function   Mood/affect:  happy  Orientation: Person, Place, Time and Situation  Appearance: age appropriate and well dressed  Family member/caregiver input: n/a    Additional exam if indicated for problems addressed today:  See separate encounter    Advice/Referrals/Counseling (as indicated)   Education and counseling provided for any problems identified above: Advanced Directives    Preventive Services     Immunization History   Administered Date(s) Administered    Influenza Vaccine 09/28/2014, 09/01/2016, 10/12/2017    Pneumococcal Conjugate (PCV-13) 03/12/2015    Pneumococcal Vaccine (Unspecified Type) 08/01/2016    Tdap 03/12/2015       (Preventive care checklist to be included in patient instructions)  Discussed today Done Previously Not Needed     X  Pneumococcal vaccines    X  Flu vaccine     X Hepatitis B vaccine (if at risk)   X   Shingles vaccine    X  TDAP vaccine    X  Mammogram     X Pap smear    X - 6 years ago - Normal  Colorectal cancer screening     X Low-dose CT for lung cancer screening   X   Bone density test   X   Glaucoma screening   X   Cholesterol test   X   Diabetes screening test      X Diabetes self-management class     X Nutritionist referral for diabetes or renal disease     Discussion of Advance Directive   Discussed with Hussein Koehler her ability to prepare and advance directive in the case that an injury or illness causes her to be unable to make health care decisions. Patient has a living will and HCPOA. States she will bring this documentation for scanning. Assessment/Plan   1.  Medicare annual wellness visit, subsequent  Hussein Koehler was counseled on age-appropriate/ guideline-based risk prevention behaviors and screening for a 77y.o. year old   female . We also discussed adjustments in screening based on family history if necessary. Printed instructions for preventative screening guidelines and healthy behaviors given to patient with after visit summary.  - Annual  Alcohol Screen 15 min ()  - Depression Screen Annual    2. Screening for alcoholism  Negative  - Annual  Alcohol Screen 15 min ()    3. Screening for depression  Negative  - Depression Screen Annual    4. Need for shingles vaccine  - varicella-zoster recombinant, PF, (SHINGRIX, PF,) 50 mcg/0.5 mL susr injection; 0.5 mL by IntraMUSCular route once for 1 dose. Dispense: 0.5 mL; Refill: 0    5. Screening for diabetes mellitus  A1c today    6. Glaucoma screening  - REFERRAL TO OPHTHALMOLOGY    7. Osteopenia, unspecified location  - Dexa Bone Density Study Axial ([id number]); Future  - TSH RFX ON ABNORMAL TO FREE T4    8. Disorder of bone and cartilage  - Dexa Bone Density Study Axial ([id number]); Future      I have discussed the diagnosis with the patient and the intended plan as seen in the above orders. she has expressed understanding. The patient has received an after-visit summary and questions were answered concerning future plans. I have discussed medication side effects and warnings with the patient as well. Follow-up Disposition:  Return in about 1 year (around 4/3/2019).     Electronically Signed: Emerita Paige MD

## 2018-04-03 NOTE — PATIENT INSTRUCTIONS
Medicare Wellness Visit, Female    The best way to live healthy is to have a healthy lifestyle by eating a well-balanced diet, exercising regularly, limiting alcohol and stopping smoking. Regular physical exams and screening tests are another way to keep healthy. Preventive exams provided by your health care provider can find health problems before they become diseases or illnesses. Preventive services including immunizations, screening tests, monitoring and exams can help you take care of your own health. All people over age 72 should have a pneumovax  and and a prevnar shot to prevent pneumonia. These are once in a lifetime unless you and your provider decide differently. All people over 65 should have a yearly flu shot and a tetanus vaccine every 10 years. A bone mass density to screen for osteoporosis or thinning of the bones should be done every 2 years after 65. Screening for diabetes mellitus with a blood sugar test should be done every year. Glaucoma is a disease of the eye due to increased ocular pressure that can lead to blindness and it should be done every year by an eye professional.    Cardiovascular screening tests that check for elevated lipids (fatty part of blood) which can lead to heart disease and strokes should be done every 5 years. Colorectal screening that evaluates for blood or polyps in your colon should be done yearly as a stool test or every five years as a flexible sigmoidoscope or every 10 years as a colonoscopy up to age 76. Breast cancer screening with a mammogram is recommended biennially  for women age 54-69. Screening for cervical cancer with a pap smear and pelvic exam is recommended for women after age 72 years every 2 years up to age 79 or when the provider and patient decide to stop. If there is a history of cervical abnormalities or other increased risk for cancer then the test is recommended yearly.     Hepatitis C screening is also recommended for anyone born between [birth year] through [address]. A shingles vaccine is also recommended once in a lifetime after age 61. Your Medicare Wellness Exam is recommended annually. Here is a list of your current Health Maintenance items with a due date:  Health Maintenance Due   Topic Date Due    Stool testing for trace blood  03/28/2017    Annual Well Visit  03/29/2018            Advance Care Planning: Care Instructions  Your Care Instructions  It can be hard to live with an illness that cannot be cured. But if your health is getting worse, you may want to make decisions about end-of-life care. Planning for the end of your life does not mean that you are giving up. It is a way to make sure that your wishes are met. Clearly stating your wishes can make it easier for your loved ones. Making plans while you are still able may also ease your mind and make your final days less stressful and more meaningful. Follow-up care is a key part of your treatment and safety. Be sure to make and go to all appointments, and call your doctor if you are having problems. It's also a good idea to know your test results and keep a list of the medicines you take. What can you do to plan for the end of life? · You can bring these issues up with your doctor. You do not need to wait until your doctor starts the conversation. You might start with \"I would not be willing to live with . Gillie Drones Gillie Drones Gillie Drones \" When you complete this sentence it helps your doctor understand your wishes. · Talk openly and honestly with your doctor. This is the best way to understand the decisions you will need to make as your health changes. Know that you can always change your mind. · Ask your doctor about commonly used life-support measures. These include tube feedings, breathing machines, and fluids given through a vein (IV). Understanding these treatments will help you decide whether you want them. · You may choose to have these life-supporting treatments for a limited time. This allows a trial period to see whether they will help you. You may also decide that you want your doctor to take only certain measures to keep you alive. It is important to spell out these conditions so that your doctor and family understand them. · Talk to your doctor about how long you are likely to live. He or she may be able to give you an idea of what usually happens with your specific illness. · Think about preparing papers that state your wishes. This way there will not be any confusion about what you want. You can change your instructions at any time. Which papers should you prepare? Advance directives are legal papers that tell doctors how you want to be cared for at the end of your life. You do not need a  to write these papers. Ask your doctor or your state health department for information on how to write your advance directives. They may have the forms for each of these types of papers. Make sure your doctor has a copy of these on file, and give a copy to a family member or close friend. · Consider a do-not-resuscitate order (DNR). This order asks that no extra treatments be done if your heart stops or you stop breathing. Extra treatments may include cardiopulmonary resuscitation (CPR), electrical shock to restart your heart, or a machine to breathe for you. If you decide to have a DNR order, ask your doctor to explain and write it. Place the order in your home where everyone can easily see it. · Consider a living will. A living will explains your wishes about life support and other treatments at the end of your life if you become unable to speak for yourself. Living cole tell doctors to use or not use treatments that would keep you alive. You must have one or two witnesses or a notary present when you sign this form. · Consider a durable power of  for health care. This allows you to name a person to make decisions about your care if you are not able to.  Most people ask a close friend or family member. Talk to this person about the kinds of treatments you want and those that you do not want. Make sure this person understands your wishes. These legal papers are simple to change. Tell your doctor what you want to change, and ask him or her to make a note in your medical file. Give your family updated copies of the papers. Where can you learn more? Go to http://casandra-tigist.info/. Enter P184 in the search box to learn more about \"Advance Care Planning: Care Instructions. \"  Current as of: November 17, 2016  Content Version: 11.3  © 3515-5493 [employer]. Care instructions adapted under license by [employer] (which disclaims liability or warranty for this information). If you have questions about a medical condition or this instruction, always ask your healthcare professional. [address] any warranty or liability for your use of this information. Groin Strain: Care Instructions  Your Care Instructions  A groin strain is an injury that happens when you tear or overstretch (pull) a groin muscle. The groin muscles are in the area on either side of the body in the folds where the belly joins the legs. You can strain a groin muscle during exercise, such as running, skating, kicking in soccer, or playing basketball. It can happen when you lift, push, or pull heavy objects. You might pull a groin muscle when you fall. The injury can range from a minor pull to a more serious tear of the muscle. You may feel pain and tenderness that's worse when you squeeze your legs together. You may also have pain when you raise the knee of the injured side. There may be swelling or bruising in the groin area or inner thigh. If you have a bad strain, you may walk with a limp while it heals. Rest and other home care can help the muscle heal. Healing can take up to 3 weeks or more.  Your doctor may want to see you again in 2 to 3 weeks.  Follow-up care is a key part of your treatment and safety. Be sure to make and go to all appointments, and call your doctor if you are having problems. It's also a good idea to know your test results and keep a list of the medicines you take. How can you care for yourself at home? · Be safe with medicines. Read and follow all instructions on the label. ¨ If the doctor gave you a prescription medicine for pain, take it as prescribed. ¨ If you are not taking a prescription pain medicine, ask your doctor if you can take an over-the-counter medicine. · Rest and protect your injured or sore groin area for 1 to 2 weeks. Stop, change, or take a break from any activity that may be causing your pain or soreness. Do not do intense activities while you still have pain. · Put ice or a cold pack on your groin area for 10 to 20 minutes at a time. Try to do this every 1 to 2 hours for the next 3 days (when you are awake) or until the swelling goes down. Put a thin cloth between the ice and your skin. · After 2 or 3 days, if your swelling is gone, apply heat. Put a warm water bottle, a heating pad set on low, or a warm cloth on your groin area. Do not go to sleep with a heating pad on your skin. · If your doctor gave you crutches, make sure you use them as directed. · Wear snug shorts or underwear that support the injured area. When should you call for help? Call your doctor now or seek immediate medical care if:  ? · You have new or severe pain or swelling in the groin area. ? · Your groin or upper thigh is cool or pale or changes color. ? · You have tingling, weakness, or numbness in your groin or leg. ? · You cannot move your leg. ? · You cannot put weight on your leg. ? Watch closely for changes in your health, and be sure to contact your doctor if:  ? · You do not get better as expected. Where can you learn more? Go to http://casandra-tigist.info/.   Enter F412 in the search box to learn more about \"Groin Strain: Care Instructions. \"  Current as of: March 21, 2017  Content Version: 11.4  © 1408-9693 [employer]. Care instructions adapted under license by [employer] (which disclaims liability or warranty for this information). If you have questions about a medical condition or this instruction, always ask your healthcare professional. [address] any warranty or liability for your use of this information.

## 2018-04-03 NOTE — PROGRESS NOTES
Chief Complaint   Patient presents with   [facility] Wellness Visit     subsequent     1. Have you been to the ER, urgent care clinic since your last visit? Hospitalized since your last visit? No    2. Have you seen or consulted any other health care providers outside of the [address] [place] [address] since your last visit? Include any pap smears or colon screening. no      Right thigh---3 weeks ago went for walk--next day right leg became really sore. Colonoscopy--6 years ago--normal    Eye exam--need cataract surgery--right eye    Wants to see if she needs a bone density--had one several years ago--stated she has osteopenia    Ingrown hair inner thigh--near private area    Needs shingle shot---      General Health Questions   -During the past 4 weeks:   -how would you rate your health in general? Fair   -how often have you been bothered by feeling dizzy when standing up? never   -how much have you been bothered by bodily pain? moderately   -Have you noticed any hearing difficulties? yes   -has your physical and emotional health limited your social activities with family or friends? yes    Emotional Health Questions   -Do you have a history of depression, anxiety, or emotional problems? no  -Over the past 2 weeks, have you felt down, depressed or hopeless? no  -Over the past 2 weeks, have you felt little interest or pleasure in doing things? no    Health Habits   Please describe your diet habits: not good  Do you get 5 servings of fruits or vegetables daily? no  Do you exercise regularly? no    Activities of Daily Living and Functional Status   -Do you need help with eating, walking, dressing, bathing, toileting, the phone, transportation, shopping, preparing meals, housework, laundry, medications or managing money? no  -In the past four weeks, was someone available to help you if you needed and wanted help with anything? yes  -Are you confident are you that you can control and manage most of your health problems? yes and n/a  -Have you been given information to help you keep track of your medications? yes and n/a  -How often do you have trouble taking your medications as prescribed? never    Fall Risk and Home Safety   Have you fallen 2 or more times in the past year? no  Does your home have rugs in the hallway, lack grab bars in the bathroom, lack handrails on the stairs or have poor lighting? no  Do you have smoke detectors and check them regularly?  yes  Do you have difficulties driving a car? no  Do you always fasten your seat belt when you are in a car? yes

## 2018-04-04 LAB
ALBUMIN SERPL-MCNC: 4.4 G/DL (ref 3.5–4.8)
ALBUMIN/GLOB SERPL: 1.7 {RATIO} (ref 1.2–2.2)
ALP SERPL-CCNC: 49 IU/L (ref 39–117)
ALT SERPL-CCNC: 22 IU/L (ref 0–32)
AST SERPL-CCNC: 26 IU/L (ref 0–40)
BILIRUB SERPL-MCNC: 0.4 MG/DL (ref 0–1.2)
BUN SERPL-MCNC: 12 MG/DL (ref 8–27)
BUN/CREAT SERPL: 15 (ref 12–28)
CALCIUM SERPL-MCNC: 9.2 MG/DL (ref 8.7–10.3)
CHLORIDE SERPL-SCNC: 101 MMOL/L (ref 96–106)
CHOLEST SERPL-MCNC: 180 MG/DL (ref 100–199)
CO2 SERPL-SCNC: 24 MMOL/L (ref 18–29)
CREAT SERPL-MCNC: 0.81 MG/DL (ref 0.57–1)
ERYTHROCYTE [DISTWIDTH] IN BLOOD BY AUTOMATED COUNT: 13.5 % (ref 12.3–15.4)
EST. AVERAGE GLUCOSE BLD GHB EST-MCNC: 123 MG/DL
FERRITIN SERPL-MCNC: 170 NG/ML (ref 15–150)
GFR SERPLBLD CREATININE-BSD FMLA CKD-EPI: 74 ML/MIN/1.73
GFR SERPLBLD CREATININE-BSD FMLA CKD-EPI: 85 ML/MIN/1.73
GLOBULIN SER CALC-MCNC: 2.6 G/DL (ref 1.5–4.5)
GLUCOSE SERPL-MCNC: 105 MG/DL (ref 65–99)
HBA1C MFR BLD: 5.9 % (ref 4.8–5.6)
HCT VFR BLD AUTO: 39.7 % (ref 34–46.6)
HDLC SERPL-MCNC: 80 MG/DL
HGB BLD-MCNC: 12.9 G/DL (ref 11.1–15.9)
INTERPRETATION, 910389: NORMAL
LDLC SERPL CALC-MCNC: 86 MG/DL (ref 0–99)
MCH RBC QN AUTO: 29.6 PG (ref 26.6–33)
MCHC RBC AUTO-ENTMCNC: 32.5 G/DL (ref 31.5–35.7)
MCV RBC AUTO: 91 FL (ref 79–97)
PLATELET # BLD AUTO: 394 X10E3/UL (ref 150–379)
POTASSIUM SERPL-SCNC: 4.5 MMOL/L (ref 3.5–5.2)
PROT SERPL-MCNC: 7 G/DL (ref 6–8.5)
RBC # BLD AUTO: 4.36 X10E6/UL (ref 3.77–5.28)
SODIUM SERPL-SCNC: 144 MMOL/L (ref 134–144)
TRIGL SERPL-MCNC: 69 MG/DL (ref 0–149)
TSH SERPL DL<=0.005 MIU/L-ACNC: 2.84 UIU/ML (ref 0.45–4.5)
VLDLC SERPL CALC-MCNC: 14 MG/DL (ref 5–40)
WBC # BLD AUTO: 6.7 X10E3/UL (ref 3.4–10.8)

## 2018-04-10 ENCOUNTER — HOSPITAL ENCOUNTER (OUTPATIENT)
Dept: MAMMOGRAPHY | Age: 68
Discharge: HOME | End: 2018-04-10
Payer: MEDICARE

## 2018-04-10 DIAGNOSIS — Z12.39 BREAST SCREENING: ICD-10-CM

## 2018-04-10 PROCEDURE — 77063 BREAST TOMOSYNTHESIS BI: CPT

## 2018-04-10 PROCEDURE — 77067 SCR MAMMO BI INCL CAD: CPT

## 2018-04-10 NOTE — LETTER
4/10/2018 1:15 PM 
 
Ms. Cheung Screen 
[address]  [phone] Dear Jonatan Screen: 
 
Please find your most recent results below. Resulted Orders MATEUSZ 3D LORA W MAMMO BI SCREENING INCL CAD Narrative STUDY: Bilateral digital screening mammogram with 3-D tomosynthesis INDICATION:  Screening. COMPARISON:  Priors dating back to 2014 BREAST COMPOSITION:  There are scattered areas of fibroglandular density. FINDINGS: Bilateral digital screening mammography was performed and is 
interpreted in conjunction with a computer assisted detection (CAD) system. Additionally, tomosynthesis of both breasts in the CC and MLO projections was 
performed. No suspicious masses or calcifications are identified. There has been 
no significant change. Impression IMPRESSION: 
BI-RADS 1: Negative. No mammographic evidence of malignancy. RECOMMENDATIONS: 
Next screening mammogram is recommended in one year. The patient will be notified of these results. RECOMMENDATIONS: 
 
Your recent mammogram was normal.  Congratulations.  Please follow up in one year. Sincerely, Bartlett Apley, MD

## 2018-04-12 ENCOUNTER — TELEPHONE (OUTPATIENT)
Dept: FAMILY MEDICINE CLINIC | Age: 68
End: 2018-04-12

## 2018-04-12 NOTE — TELEPHONE ENCOUNTER
[phone]  Patient called with this info:    appt date June 7   @9:00 am  Dr. Nallely Monroe, DO 1 1      Diagnosis Information   Diagnosis   Z13.5 (ICD-10-CM) - Glaucoma screening

## 2018-04-13 ENCOUNTER — HOSPITAL ENCOUNTER (OUTPATIENT)
Dept: MAMMOGRAPHY | Age: 68
Discharge: HOME | End: 2018-04-13
Attending: FAMILY MEDICINE
Payer: MEDICARE

## 2018-04-13 DIAGNOSIS — M94.9 DISORDER OF BONE AND CARTILAGE: ICD-10-CM

## 2018-04-13 DIAGNOSIS — M89.9 DISORDER OF BONE AND CARTILAGE: ICD-10-CM

## 2018-04-13 DIAGNOSIS — M85.80 OSTEOPENIA, UNSPECIFIED LOCATION: ICD-10-CM

## 2018-04-13 PROCEDURE — 77080 DXA BONE DENSITY AXIAL: CPT

## 2018-05-03 ENCOUNTER — OFFICE VISIT (OUTPATIENT)
Dept: FAMILY MEDICINE CLINIC | Age: 68
End: 2018-05-03

## 2018-05-03 VITALS
BODY MASS INDEX: 43.24 KG/M2 | RESPIRATION RATE: 16 BRPM | TEMPERATURE: 97.8 F | HEIGHT: 62 IN | SYSTOLIC BLOOD PRESSURE: 153 MMHG | WEIGHT: 235 LBS | DIASTOLIC BLOOD PRESSURE: 67 MMHG | HEART RATE: 68 BPM | OXYGEN SATURATION: 97 %

## 2018-05-03 DIAGNOSIS — D23.9 DILATED PORE OF WINER: Primary | ICD-10-CM

## 2018-05-03 NOTE — MR AVS SNAPSHOT
[address] 
[phone] Patient: Katlyn Lees MRN: [id number] ZYB:4[birth year]04/[birth year]78 Visit Information Date & Time Provider Department Dept. Phone Encounter #  
 5/3/2018  2:00 PM Saroj Oro, [facility] [place] [phone] [id number] Follow-up Instructions Return if symptoms worsen or fail to improve. Your Appointments 8/30/2018 10:00 AM  
ESTABLISHED PATIENT with Amandeep Bobby MD  
[facility] (JONO SCHEDULING) Appt Note: annual  
 [address] [place] [address]  
54 [facility] [address] [address] Upcoming Health Maintenance Date Due FOBT Q 1 YEAR AGE 50-75 3/28/2017 Influenza Age 5 to Adult 8/1/2018 GLAUCOMA SCREENING Q2Y 3/28/2019 MEDICARE YEARLY EXAM 4/4/2019 BREAST CANCER SCRN MAMMOGRAM 4/10/2020 DTaP/Tdap/Td series (2 - Td) 3/12/2025 Allergies as of 5/3/2018  Review Complete On: 4/3/2018 By: Saroj Oro MD  
  
 Severity Noted Reaction Type Reactions Keflex [Cephalexin]  07/27/2015   Systemic Hives Penicillins  03/24/2016    Hives Sulfa (Sulfonamide Antibiotics)  04/07/2015    Unknown (comments) Patient doesn't know or remember Current Immunizations  Reviewed on 10/27/2017 Name Date Influenza Vaccine 10/12/2017, 9/1/2016, 9/28/2014 Pneumococcal Conjugate (PCV-13) 3/12/2015 Pneumococcal Vaccine (Unspecified Type) 8/1/2016 Tdap 3/12/2015 Not reviewed this visit You Were Diagnosed With   
  
 Codes Comments Dilated pore of Amrita    -  Primary ICD-10-CM: L70.8 ICD-9-CM: 706.1 Vitals BP Pulse Temp Resp Height(growth percentile) Weight(growth percentile)  153/67 (BP 1 Location: Left arm, BP Patient Position: Sitting) 68 97.8 °F (36.6 °C) (Oral) 16 5' 2\" (1.575 m) 235 lb (106.6 kg) LMP SpO2 BMI OB Status Smoking Status 01/28/2000 97% 42.98 kg/m2 Postmenopausal Former Smoker Vitals History BMI and BSA Data Body Mass Index Body Surface Area 42.98 kg/m 2 2.16 m 2 Preferred Pharmacy Pharmacy Name Phone 109 [facility] [phone] Your Updated Medication List  
  
   
This list is accurate as of 5/3/18  2:56 PM.  Always use your most recent med list.  
  
  
  
  
 albuterol 90 mcg/actuation inhaler Commonly known as:  PROVENTIL HFA, VENTOLIN HFA, PROAIR HFA Take 2 Puffs by inhalation every six (6) hours as needed for Wheezing. amLODIPine 10 mg tablet Commonly known as:  Delcie Perry Take 1 Tab by mouth daily. aspirin delayed-release 81 mg tablet Take  by mouth daily. calcium carbonate 200 mg calcium (500 mg) Chew Commonly known as:  TUMS Take 1 Tab by mouth as needed. docusate sodium 100 mg capsule Commonly known as:  Faith Felt Take 100 mg by mouth two (2) times a day. ferrous sulfate 325 mg (65 mg iron) tablet Commonly known as:  Iron (Ferrous Sulfate) Take 1 Tab by mouth Daily (before breakfast). pravastatin 40 mg tablet Commonly known as:  PRAVACHOL Take 1 Tab by mouth nightly. predniSONE 20 mg tablet Commonly known as:  [facility] Take 1 Tab by mouth daily (with breakfast). VITAMIN D3 2,000 unit Tab Generic drug:  cholecalciferol (vitamin D3) Take  by mouth daily. Follow-up Instructions Return if symptoms worsen or fail to improve. Introducing hospitals & HEALTH SERVICES! Dear Chidi Corona: Thank you for requesting a [employer] account. Our records indicate that you already have an active [employer] account. You can access your account anytime at [email or web address] Did you know that you can access your hospital and ER discharge instructions at any time in [employer]? You can also review all of your test results from your hospital stay or ER visit. Additional Information If you have questions, please visit the Frequently Asked Questions section of the [employer] website at [email or web address] Remember, [employer] is NOT to be used for urgent needs. For medical emergencies, dial 911. Now available from your iPhone and Android! Please provide this summary of care documentation to your next provider. Your primary care clinician is listed as Patricia De Jesus. If you have any questions after today's visit, please call [phone].

## 2018-05-03 NOTE — PROGRESS NOTES
Chief Complaint   Patient presents with    Skin Problem     1. Have you been to the ER, urgent care clinic since your last visit? Hospitalized since your last visit? No    2. Have you seen or consulted any other health care providers outside of the [address] [place] [address] since your last visit? Include any pap smears or colon screening. No    [facility]  OFFICE PROCEDURE PROGRESS NOTE        Chart reviewed for the following:   Yuri Camacho LPN, have reviewed the History, Physical and updated the Allergic reactions for [place] performed immediately prior to start of procedure:   Yuri Camacho LPN, have performed the following reviews on [employer] prior to the start of the procedure:            * Patient was identified by name and date of birth   * Agreement on procedure being performed was verified  * Risks and Benefits explained to the patient  * Procedure site verified and marked as necessary  * Patient was positioned for comfort  * Consent was signed and verified     Time: 2:45pm      Date of procedure: 5/3/2018    Procedure performed by:  Saúl Vázquez MD    Provider assisted by: Cande Esposito LPN    Patient assisted by: None. How tolerated by patient: Tolerated well. Post Procedural Pain Scale: 0    Comments: None.

## 2018-05-04 ENCOUNTER — TELEPHONE (OUTPATIENT)
Dept: FAMILY MEDICINE CLINIC | Age: 68
End: 2018-05-04

## 2018-05-04 PROBLEM — M85.80 OSTEOPENIA: Status: ACTIVE | Noted: 2018-05-04

## 2018-05-07 NOTE — TELEPHONE ENCOUNTER
Overall your blood test are normal.  Your blood sugar is slightly elevated and in the category of pre-diabetes.  Diety change, with reduced sugary foods and carbohydrates and increased physical activity can help reduce your risk of progressing to full diabetes.   Let me know if you have any questions.

## 2018-05-17 ENCOUNTER — TELEPHONE (OUTPATIENT)
Dept: FAMILY MEDICINE CLINIC | Age: 68
End: 2018-05-17

## 2018-05-17 NOTE — TELEPHONE ENCOUNTER
----- Message from [facility] sent at 5/17/2018  1:19 PM EDT -----  Regarding: Dr. Squires Senters  Patient is calling to have Nurse or the Dr to return call. Stated\"right ear ache\"  Would like to know what to do; if need to come in or not. Needs name of some ENT practices.   Best contact [phone]

## 2018-05-18 NOTE — TELEPHONE ENCOUNTER
Spoke with Ms. Lexi Jacobsen, states ear pain is intermittent and had for a long time, pain has been getting worse since Sunday 05/13/18. Scheduled appointment for Tuesday 05/22 at 10:50.

## 2018-05-22 ENCOUNTER — OFFICE VISIT (OUTPATIENT)
Dept: FAMILY MEDICINE CLINIC | Age: 68
End: 2018-05-22

## 2018-05-22 VITALS
HEART RATE: 69 BPM | WEIGHT: 236 LBS | TEMPERATURE: 98.2 F | RESPIRATION RATE: 18 BRPM | SYSTOLIC BLOOD PRESSURE: 132 MMHG | DIASTOLIC BLOOD PRESSURE: 76 MMHG | HEIGHT: 62 IN | BODY MASS INDEX: 43.43 KG/M2 | OXYGEN SATURATION: 97 %

## 2018-05-22 DIAGNOSIS — H92.01 RIGHT EAR PAIN: Primary | ICD-10-CM

## 2018-05-22 DIAGNOSIS — Z12.11 COLON CANCER SCREENING: ICD-10-CM

## 2018-05-22 DIAGNOSIS — Z97.4 USES HEARING AID: ICD-10-CM

## 2018-05-22 RX ORDER — LANOLIN ALCOHOL/MO/W.PET/CERES
500 CREAM (GRAM) TOPICAL DAILY
COMMUNITY

## 2018-05-22 RX ORDER — HYDROCORTISONE AND ACETIC ACID 20.75; 10.375 MG/ML; MG/ML
2 SOLUTION AURICULAR (OTIC) 2 TIMES DAILY
Qty: 1 BOTTLE | Refills: 1 | Status: SHIPPED | OUTPATIENT
Start: 2018-05-22 | End: 2018-05-27

## 2018-05-22 NOTE — PROGRESS NOTES
1. Have you been to the ER, urgent care clinic since your last visit? Hospitalized since your last visit? No    2. Have you seen or consulted any other health care providers outside of the [address] [place] [address] since your last visit? Include any pap smears or colon screening. No      Chief Complaint   Patient presents with    Ear Pain     Right ear     Blood pressure 132/76, pulse 69, temperature 98.2 °F (36.8 °C), temperature source Oral, resp. rate 18, height 5' 2\" (1.575 m), weight 236 lb (107 kg), last menstrual period 01/28/2000, SpO2 97 %.

## 2018-05-22 NOTE — MR AVS SNAPSHOT
[address] [place] [id number] Patient: Anais Encarnacion MRN: [id number] PKA:2/43/4839 Visit Information Date & Time Provider Department Dept. Phone Encounter #  
 5/22/2018 10:50 AM Bharathi Reagan MD [facility] [phone] [id number] Your Appointments 8/30/2018 10:00 AM  
ESTABLISHED PATIENT with Janelle Chavez MD  
[facility] ([facility]ON[facility] SCHEDULING) Appt Note: annual  
 [address] [facility] 600 70 [facility]  
54 [facility] [address] [address] Upcoming Health Maintenance Date Due FOBT Q 1 YEAR AGE 50-75 5/22/2019* Influenza Age 5 to Adult 8/1/2018 GLAUCOMA SCREENING Q2Y 3/28/2019 MEDICARE YEARLY EXAM 4/4/2019 BREAST CANCER SCRN MAMMOGRAM 4/10/2020 DTaP/Tdap/Td series (2 - Td) 3/12/2025 *Topic was postponed. The date shown is not the original due date. Allergies as of 5/22/2018  Review Complete On: 5/22/2018 By: Bharathi Reagan MD  
  
 Severity Noted Reaction Type Reactions Keflex [Cephalexin]  07/27/2015   Systemic Hives Penicillins  03/24/2016    Hives Sulfa (Sulfonamide Antibiotics)  04/07/2015    Unknown (comments) Patient doesn't know or remember Current Immunizations  Reviewed on 5/22/2018 Name Date Influenza Vaccine 10/12/2017, 9/1/2016, 9/28/2014 Pneumococcal Conjugate (PCV-13) 3/12/2015 Pneumococcal Vaccine (Unspecified Type) 8/1/2016 Tdap 3/12/2015 Reviewed by Jay Akbar LPN on 1/88/4507 at 31:42 AM  
You Were Diagnosed With   
  
 Codes Comments Right ear pain    -  Primary ICD-10-CM: H92.01 
ICD-9-CM: 388.70 Uses hearing aid     ICD-10-CM: Z97.4 ICD-9-CM: V45.89 Colon cancer screening     ICD-10-CM: Z12.11 ICD-9-CM: V76.51 Vitals BP Pulse Temp Resp Height(growth percentile) Weight(growth percentile) 132/76 (BP 1 Location: Left arm, BP Patient Position: Sitting) 69 98.2 °F (36.8 °C) (Oral) 18 5' 2\" (1.575 m) 236 lb (107 kg) LMP SpO2 BMI OB Status Smoking Status 01/28/2000 97% 43.16 kg/m2 Postmenopausal Former Smoker BMI and BSA Data Body Mass Index Body Surface Area  
 43.16 kg/m 2 2.16 m 2 Preferred Pharmacy Pharmacy Name Phone 109 [facility] [phone] Your Updated Medication List  
  
   
This list is accurate as of 5/22/18 11:32 AM.  Always use your most recent med list.  
  
  
  
  
 albuterol 90 mcg/actuation inhaler Commonly known as:  PROVENTIL HFA, VENTOLIN HFA, PROAIR HFA Take 2 Puffs by inhalation every six (6) hours as needed for Wheezing. amLODIPine 10 mg tablet Commonly known as:  Leward Carmona Take 1 Tab by mouth daily. aspirin delayed-release 81 mg tablet Take  by mouth daily. calcium carbonate 200 mg calcium (500 mg) Chew Commonly known as:  TUMS Take 1 Tab by mouth as needed. docusate sodium 100 mg capsule Commonly known as:  Son Lints Take 100 mg by mouth two (2) times a day. ferrous sulfate 325 mg (65 mg iron) tablet Commonly known as:  Iron (Ferrous Sulfate) Take 1 Tab by mouth Daily (before breakfast). hydrocortisone-acetic acid otic solution Commonly known as:  VOSOL-HC Administer 2 Drops in right ear two (2) times a day for 5 days. pravastatin 40 mg tablet Commonly known as:  PRAVACHOL Take 1 Tab by mouth nightly. VITAMIN C 500 mg Chew Generic drug:  ascorbic acid (vitamin C) Take 500 mg by mouth daily. VITAMIN D3 2,000 unit Tab Generic drug:  cholecalciferol (vitamin D3) Take  by mouth daily. VITAMIN E PO Take 1 Cap by mouth daily. Prescriptions Printed  Refills  
 hydrocortisone-acetic acid (VOSOL-HC) otic solution 1  
 Sig: Administer 2 Drops in right ear two (2) times a day for 5 days. Class: Print Route: Right Ear We Performed the Following OCCULT BLOOD, IMMUNOASSAY (FIT) [id number] CPT(R)] Patient Instructions Use ear drops as needed for ear pain Return your stool cards when ready Introducing [facility] [place] [facility] SERVICES! Dear Rachel Mess: Thank you for requesting a [employer] account. Our records indicate that you already have an active [employer] account. You can access your account anytime at [email or web address] Did you know that you can access your hospital and ER discharge instructions at any time in [employer]? You can also review all of your test results from your hospital stay or ER visit. Additional Information If you have questions, please visit the Frequently Asked Questions section of the [employer] website at [email or web address] Remember, [employer] is NOT to be used for urgent needs. For medical emergencies, dial 911. Now available from your iPhone and Android! Please provide this summary of care documentation to your next provider. Your primary care clinician is listed as Nicholas Garcia. If you have any questions after today's visit, please call [phone].

## 2018-05-22 NOTE — PROGRESS NOTES
Isaac Garcia is a 77 y.o. female      Issues discussed today include:        Signs and symptoms:  Right ear pain  Duration:  weeks  Context:  Comes and goes. She wears hearing aids both ears  Location:  Right canal  Quality:  irritation  Severity: Looks OK today   Timing:  Comes goes  Modifying factors:  Rx Vosol HC drops    Data reviewed or ordered today:  FOBT    Other problems include:  Patient Active Problem List   Diagnosis Code    History of normal resting EKG [id number]    CVD (cerebrovascular disease) I67.9    S/P colonoscopy Z98.890    GERD (gastroesophageal reflux disease) K21.9    [place] (hard of hearing) H91.90    Migraine G43.909   [address] maintenance Z00.00    Obesity, Class II, BMI 35-39.9, with comorbidity E66.9    Osteoarthritis (arthritis due to wear and tear of joints) M19.90    DDD (degenerative disc disease), lumbar M51.36    Wheezing R06.2    Essential hypertension with goal blood pressure less than 140/90 I10    Pure hypercholesterolemia E78.00    Bilateral carotid artery stenosis I65.23    History of cervical dysplasia Z87.410    Lymphedema I89.0    Obesity, morbid (HCC) E66.01    Osteopenia M85.80       Medications:  Current Outpatient Prescriptions   Medication Sig Dispense Refill    ascorbic acid, vitamin C, (VITAMIN C) 500 mg chew Take 500 mg by mouth daily.  vitamin E acetate (VITAMIN E PO) Take 1 Cap by mouth daily.  ferrous sulfate (IRON, FERROUS SULFATE,) 325 mg (65 mg iron) tablet Take 1 Tab by mouth Daily (before breakfast). 90 Tab 0    albuterol (PROVENTIL HFA, VENTOLIN HFA, PROAIR HFA) 90 mcg/actuation inhaler Take 2 Puffs by inhalation every six (6) hours as needed for Wheezing. 1 Inhaler 1    amLODIPine (NORVASC) 10 mg tablet Take 1 Tab by mouth daily. 90 Tab 3    pravastatin (PRAVACHOL) 40 mg tablet Take 1 Tab by mouth nightly. 90 Tab 3    aspirin delayed-release 81 mg tablet Take  by mouth daily.       calcium carbonate (TUMS) 200 mg calcium (500 mg) chew Take 1 Tab by mouth as needed.  docusate sodium (COLACE) 100 mg capsule Take 100 mg by mouth two (2) times a day.  cholecalciferol, vitamin D3, (VITAMIN D3) 2,000 unit tab Take  by mouth daily.  predniSONE (DELTASONE) 20 mg tablet Take 1 Tab by mouth daily (with breakfast). 5 Tab 0       Allergies: Allergies   Allergen Reactions    Keflex [Cephalexin] Hives    Penicillins Hives    Sulfa (Sulfonamide Antibiotics) Unknown (comments)     Patient doesn't know or remember        LMP:  Patient's last menstrual period was 01/28/2000. Social History     Social History    Marital status:      Spouse name: N/A    Number of children: N/A    Years of education: N/A     Occupational History    Not on file. Social History Main Topics    Smoking status: Former Smoker     Quit date: 1/28/1985    Smokeless tobacco: Never Used    Alcohol use No    Drug use: No    Sexual activity: Yes     Partners: Male     Other Topics Concern    Not on file     Social History Narrative         Family History   Problem Relation Age of Onset    Hypertension Father     Heart Disease Father     Diabetes Father     Heart Disease Mother     COPD Sister          Meaningful use:  done      ROS:  Headaches:  no  Chest Pain:  no  SOB:  no  Fevers:  no    Other significant ROS:      Patient's last menstrual period was 01/28/2000.     Physical Exam  Visit Vitals    /76 (BP 1 Location: Left arm, BP Patient Position: Sitting)    Pulse 69    Temp 98.2 °F (36.8 °C) (Oral)    Resp 18    Ht 5' 2\" (1.575 m)    Wt 236 lb (107 kg)    LMP 01/28/2000    SpO2 97%    BMI 43.16 kg/m2     BP Readings from Last 3 Encounters:   05/22/18 132/76   05/03/18 153/67   04/03/18 139/69     Constitutional:  Appears well,  No Acute Distress, Vitals noted  Psychiatric:   Affect normal, Alert and cooperative, Oriented to person/place/time    Eyes:   Pupils equally round and reactive, EOMI, conjunctiva clear, eyelids normal  ENT:   External ears and nose normal/lips, teeth=OK/gums normal, TMs and Orophyarynx normal  Neck:  general inspection and Thyroid normal.  No abnormal cervical or supraclavicular nodes              Assessment:    Patient Active Problem List   Diagnosis Code    History of normal resting EKG [id number]    CVD (cerebrovascular disease) I67.9    S/P colonoscopy Z98.890    GERD (gastroesophageal reflux disease) K21.9    [place] (hard of hearing) H91.90    Migraine G43.909   [address] maintenance Z00.00    Obesity, Class II, BMI 35-39.9, with comorbidity E66.9    Osteoarthritis (arthritis due to wear and tear of joints) M19.90    DDD (degenerative disc disease), lumbar M51.36    Wheezing R06.2    Essential hypertension with goal blood pressure less than 140/90 I10    Pure hypercholesterolemia E78.00    Bilateral carotid artery stenosis I65.23    History of cervical dysplasia Z87.410    Lymphedema I89.0    Obesity, morbid (HCC) E66.01    Osteopenia M85.80       Today's diagnoses are:    ICD-10-CM ICD-9-CM    1. Right ear pain H92.01 388.70    2. Uses hearing aid Z97.4 V45.89    3. Colon cancer screening Z12.11 V76.51 OCCULT BLOOD, IMMUNOASSAY (FIT)       Plan:  Orders Placed This Encounter    OCCULT BLOOD, IMMUNOASSAY (FIT)    ascorbic acid, vitamin C, (VITAMIN C) 500 mg chew     Sig: Take 500 mg by mouth daily.  vitamin E acetate (VITAMIN E PO)     Sig: Take 1 Cap by mouth daily. See patient instructions  There are no Patient Instructions on file for this visit.       refresh note:  done    AVS Printed:  done

## 2018-05-24 NOTE — PROGRESS NOTES
SUBJECTIVE:   Marli Lambert is a 77 y.o. female who presents for lesion removal. We have already discussed this procedure, including option of not performing surgery, technique of surgery and potential for scarring at a recent visit. OBJECTIVE:   Patient appears well. Vitals are normal.  Skin: [id number] right labial lesion identified. ASSESSMENT:   Initial Dx: Cutaneous Horn  Final Dx: Dilated Pore of Amrita    PLAN:   After informed consent was obtained, using chlorhexadine for cleansing and 1% Lidocaine with epinephrine for anesthetic, with sterile technique, blunt dissection with hemostat was performed. Large sebaceous plug removed. Antibiotic dressing is applied, and wound care instructions provided. Be alert for any signs of cutaneous infection. The procedure was well tolerated without complications. Follow up: the patient may return prn.

## 2018-06-02 ENCOUNTER — HOSPITAL ENCOUNTER (OUTPATIENT)
Dept: LAB | Age: 68
Discharge: HOME | End: 2018-06-02
Payer: MEDICARE

## 2018-06-02 PROCEDURE — 82270 OCCULT BLOOD FECES: CPT

## 2018-06-05 LAB — HEMOCCULT STL QL IA: NEGATIVE

## 2018-07-04 NOTE — LETTER
4/5/2017 8:34 AM 
 
Ms. Sarah Holguin 
[address] [phone] Dear Sarah Holguin: 
 
Please find your most recent results below. Resulted Orders CBC W/O DIFF Result Value Ref Range WBC 9.8 3.4 - 10.8 x10E3/uL  
 RBC 4.34 3.77 - 5.28 x10E6/uL HGB 13.0 11.1 - 15.9 g/dL HCT 40.0 34.0 - 46.6 % MCV 92 79 - 97 fL  
 MCH 30.0 26.6 - 33.0 pg  
 MCHC 32.5 31.5 - 35.7 g/dL  
 RDW 14.0 12.3 - 15.4 % PLATELET 953 (H) 947 - 379 x10E3/uL Narrative Performed at:  [address]  [id number] : Nova Huddleston MD, Phone:  [phone] METABOLIC PANEL, COMPREHENSIVE Result Value Ref Range Glucose 97 65 - 99 mg/dL BUN 15 8 - 27 mg/dL Creatinine 0.89 0.57 - 1.00 mg/dL GFR est non-AA 66 >59 mL/min/1.73 GFR est AA 76 >59 mL/min/1.73  
 BUN/Creatinine ratio 17 12 - 28 Comment: **Please note reference interval change** Sodium 143 134 - 144 mmol/L Potassium 5.0 3.5 - 5.2 mmol/L Chloride 100 96 - 106 mmol/L  
 CO2 27 18 - 29 mmol/L Calcium 9.3 8.7 - 10.3 mg/dL Protein, total 6.6 6.0 - 8.5 g/dL Albumin 4.1 3.6 - 4.8 g/dL GLOBULIN, TOTAL 2.5 1.5 - 4.5 g/dL A-G Ratio 1.6 1.2 - 2.2 Comment: **Please note reference interval change** Bilirubin, total 0.4 0.0 - 1.2 mg/dL Alk. phosphatase 50 39 - 117 IU/L  
 AST (SGOT) 24 0 - 40 IU/L  
 ALT (SGPT) 22 0 - 32 IU/L Narrative Performed at:  [address]  [id number] : Nova Huddleston MD, Phone:  [phone] CHOLESTEROL, HDL Result Value Ref Range HDL Cholesterol 70 >39 mg/dL Narrative Performed at:  [address]  [id number] : Nova Huddleston MD, Phone:  [phone] CHOLESTEROL, TOTAL Result Value Ref Range Cholesterol, total 159 100 - 199 mg/dL Narrative Performed at:  [address]  [id number] : Vernon Martínez MD, Phone:  [phone] LDL, DIRECT Result Value Ref Range LDL,Direct 80 0 - 99 mg/dL Narrative Performed at:  [address]  [id number] : Vernon Martínez MD, Phone:  [phone] TSH 3RD GENERATION Result Value Ref Range TSH 4.330 0.450 - 4.500 uIU/mL Narrative Performed at:  [address]  [id number] : Vernon Martínez MD, Phone:  [phone] CVD REPORT Result Value Ref Range INTERPRETATION Note Comment:  
   Supplement report is available. Narrative Performed at:  [address] [place] [address]  [id number] : Roger Neal PhD, Phone:  [phone] Your labs look good. Focus on regular exercise (150 minutes each week) and healthy eating.  Eat more fruits and vegetables.  Eat more protein (egg whites, beans, and nuts you know you tolerate) and less carbohydrates (white bread, white rice, white pasta, white potatoes, sodas, and sweets).  Eat appropriately small portion sizes. Sincerely, Maryam Rogers MD 
 Alert and oriented to person, place and time

## 2018-08-30 ENCOUNTER — OFFICE VISIT (OUTPATIENT)
Dept: CARDIOLOGY CLINIC | Age: 68
End: 2018-08-30

## 2018-08-30 VITALS
HEART RATE: 68 BPM | BODY MASS INDEX: 42.69 KG/M2 | HEIGHT: 62 IN | WEIGHT: 232 LBS | DIASTOLIC BLOOD PRESSURE: 65 MMHG | SYSTOLIC BLOOD PRESSURE: 140 MMHG

## 2018-08-30 DIAGNOSIS — I10 ESSENTIAL HYPERTENSION: Primary | ICD-10-CM

## 2018-08-30 DIAGNOSIS — E78.5 DYSLIPIDEMIA: ICD-10-CM

## 2018-08-30 NOTE — MR AVS SNAPSHOT
[address] [place] [address] 
[phone] Patient: Darryn Dumas MRN: [id number] ISE:[id number] Visit Information Date & Time Provider Department Dept. Phone Encounter #  
 8/30/2018 10:00 AM Lucio Hughes MD [facility] Filomena Chu [phone] [id number] Your Appointments 9/14/2018  9:00 AM  
VASCULAR TEST with VASCULAR, MADELYN  
[facility] (JONO SCHEDULING) Appt Note: carotids per dr iGl Horton [address] [place] [address]  
[phone]  
  
   
 [address] [place] [address] [address]  
  
    
 9/12/2019 10:00 AM  
ESTABLISHED PATIENT with Lucio Hughes MD  
[facility] ([address]) Appt Note: annual  
 [address] [place] [address]  
[address] [place] [address] [address] Upcoming Health Maintenance Date Due Influenza Age 5 to Adult 8/1/2018 MEDICARE YEARLY EXAM 4/4/2019 FOBT Q 1 YEAR AGE 50-75 6/2/2019 BREAST CANCER SCRN MAMMOGRAM 4/10/2020 GLAUCOMA SCREENING Q2Y 6/7/2020 DTaP/Tdap/Td series (2 - Td) 3/12/2025 Allergies as of 8/30/2018  Review Complete On: 8/30/2018 By: Annabella Padilla LPN Severity Noted Reaction Type Reactions Beef Containing Products  08/30/2018    Hives Keflex [Cephalexin]  07/27/2015   Systemic Hives Penicillins  03/24/2016    Hives Sulfa (Sulfonamide Antibiotics)  04/07/2015    Unknown (comments) Patient doesn't know or remember Current Immunizations  Reviewed on 5/22/2018 Name Date Influenza Vaccine 10/12/2017, 9/1/2016, 9/28/2014 Pneumococcal Conjugate (PCV-13) 3/12/2015 Pneumococcal Vaccine (Unspecified Type) 8/1/2016 Tdap 3/12/2015 Not reviewed this visit You Were Diagnosed With   
  
 Codes Comments Essential hypertension    -  Primary ICD-10-CM: I10 
ICD-9-CM: 401.9 Dyslipidemia     ICD-10-CM: E78.5 ICD-9-CM: 272.4 Vitals BP Pulse Height(growth percentile) Weight(growth percentile) LMP BMI  
 140/65 68 5' 2\" (1.575 m) 232 lb (105.2 kg) 01/28/2000 42.43 kg/m2 OB Status Smoking Status Postmenopausal Former Smoker Vitals History BMI and BSA Data Body Mass Index Body Surface Area  
 42.43 kg/m 2 2.15 m 2 Preferred Pharmacy Pharmacy Name Phone [facility] [phone] Your Updated Medication List  
  
   
This list is accurate as of 8/30/18 10:58 AM.  Always use your most recent med list.  
  
  
  
  
 albuterol 90 mcg/actuation inhaler Commonly known as:  PROVENTIL HFA, VENTOLIN HFA, PROAIR HFA Take 2 Puffs by inhalation every six (6) hours as needed for Wheezing. amLODIPine 10 mg tablet Commonly known as:  Ignacio Del Take 1 Tab by mouth daily. aspirin delayed-release 81 mg tablet Take  by mouth daily. calcium carbonate 200 mg calcium (500 mg) Chew Commonly known as:  TUMS Take 1 Tab by mouth as needed. docusate sodium 100 mg capsule Commonly known as:  Maretta Roman Take 100 mg by mouth daily. ferrous sulfate 325 mg (65 mg iron) tablet Commonly known as:  Iron (Ferrous Sulfate) Take 1 Tab by mouth Daily (before breakfast). pravastatin 40 mg tablet Commonly known as:  PRAVACHOL Take 1 Tab by mouth nightly. VITAMIN C 500 mg Chew Generic drug:  ascorbic acid (vitamin C) Take 500 mg by mouth daily. VITAMIN D3 2,000 unit Tab Generic drug:  cholecalciferol (vitamin D3) Take  by mouth daily. VITAMIN E PO Take 1 Cap by mouth daily. We Performed the Following AMB POC EKG ROUTINE W/ 12 LEADS, INTER & REP [32331 CPT(R)] Introducing [facility]!    
 Dear Yulissa Hirsch: 
 Thank you for requesting a [facility] [employer] account. Our records indicate that you already have an active [employer] account. You can access your account anytime at [email or web address] Did you know that you can access your hospital and ER discharge instructions at any time in [facility] [facility] [employer]? You can also review all of your test results from your hospital stay or ER visit. Additional Information If you have questions, please visit the Frequently Asked Questions section of the [employer] website at [email or web address] Remember, [facility] [employer] is NOT to be used for urgent needs. For medical emergencies, dial 911. Now available from your iPhone and Android! Please provide this summary of care documentation to your next provider. Your primary care clinician is listed as Geo Hirsch. If you have any questions after today's visit, please call [phone].

## 2018-08-30 NOTE — PROGRESS NOTES
Visit Vitals    /65    Pulse 68    Ht 5' 2\" (1.575 m)    Wt 232 lb (105.2 kg)    LMP 01/28/2000    BMI 42.43 kg/m2

## 2018-08-30 NOTE — PROGRESS NOTES
LAST OFFICE VISIT : 8/29/17      ICD-10-CM ICD-9-CM   1. Essential hypertension I10 401.9   2. Dyslipidemia E78.5 272.4      Jessica Hernandez is a 77 y.o. female with HTN, dyslipidemia, left subclavian stenosis that is asx referred for annual follow up. Cardiac risk factors: dyslipidemia, sedentary life style, hypertension, post-menopausal.   I have personally obtained the history from the patient. HISTORY OF PRESENTING ILLNESS      Ms. Jesse Moreno states she is doing well. She has not bee exercising regularly due to spinal stenosis. She also plans to see a rheumatologist for her hip pain. Patient denies any exertional chest pain, dyspnea, palpitations, syncope, orthopnea, edema or paroxysmal nocturnal dyspnea. Of note, she does not believe she is allergic to Penicillin. She is interested in getting an allergy testing.       ACTIVE PROBLEM LIST     Patient Active Problem List    Diagnosis Date Noted    Osteopenia 05/04/2018    Obesity, morbid ([address] 01/29/2018    Lymphedema 08/22/2016    History of cervical dysplasia 06/24/2016    Essential hypertension with goal blood pressure less than 140/90 06/20/2016    Pure hypercholesterolemia 06/20/2016    Bilateral carotid artery stenosis 06/20/2016    Wheezing 04/04/2016    DDD (degenerative disc disease), lumbar 03/24/2016    Obesity, Class II, BMI 35-39.9, with comorbidity 04/07/2015    Osteoarthritis (arthritis due to wear and tear of joints) 04/07/2015    Healthcare maintenance 03/12/2015    History of normal resting EKG 01/28/2015    CVD (cerebrovascular disease) 01/28/2015    S/P colonoscopy 01/28/2015    GERD (gastroesophageal reflux disease) 01/28/2015    [place] (hard of hearing) 01/28/2015    Migraine 01/28/2015           PAST MEDICAL HISTORY     Past Medical History:   Diagnosis Date    Bronchial asthma     Hypercholesterolemia     Hypertension            PAST SURGICAL HISTORY     Past Surgical History:   Procedure Laterality Date    HX BREAST BIOPSY Left 1990s    negative pathology    HX CATARACT REMOVAL  06/2018    R Eye    HX HEENT      tonsilectomy    HX OTHER SURGICAL      cervical cryotherapy x3    HX TUBAL LIGATION            ALLERGIES     Allergies   Allergen Reactions    Beef Containing Products Hives    Keflex [Cephalexin] Hives    Penicillins Hives    Sulfa (Sulfonamide Antibiotics) Unknown (comments)     Patient doesn't know or remember           FAMILY HISTORY     Family History   Problem Relation Age of Onset    Hypertension Father     Heart Disease Father     Diabetes Father     Heart Disease Mother    [facility] COPD Sister     negative for cardiac disease       SOCIAL HISTORY     Social History     Social History    Marital status:      Spouse name: N/A    Number of children: N/A    Years of education: N/A     Social History Main Topics    Smoking status: Former Smoker     Quit date: 1/28/1985    Smokeless tobacco: Never Used    Alcohol use No    Drug use: No    Sexual activity: Yes     Partners: Male     Other Topics Concern    None     Social History Narrative         MEDICATIONS     Current Outpatient Prescriptions   Medication Sig    ascorbic acid, vitamin C, (VITAMIN C) 500 mg chew Take 500 mg by mouth daily.  vitamin E acetate (VITAMIN E PO) Take 1 Cap by mouth daily.  ferrous sulfate (IRON, FERROUS SULFATE,) 325 mg (65 mg iron) tablet Take 1 Tab by mouth Daily (before breakfast).  albuterol (PROVENTIL HFA, VENTOLIN HFA, PROAIR HFA) 90 mcg/actuation inhaler Take 2 Puffs by inhalation every six (6) hours as needed for Wheezing.  amLODIPine (NORVASC) 10 mg tablet Take 1 Tab by mouth daily.  pravastatin (PRAVACHOL) 40 mg tablet Take 1 Tab by mouth nightly.  aspirin delayed-release 81 mg tablet Take  by mouth daily.  calcium carbonate (TUMS) 200 mg calcium (500 mg) chew Take 1 Tab by mouth as needed.  docusate sodium (COLACE) 100 mg capsule Take 100 mg by mouth daily.     cholecalciferol, vitamin D3, (VITAMIN D3) 2,000 unit tab Take  by mouth daily. No current facility-administered medications for this visit. I have reviewed the nurses notes, vitals, problem list, allergy list, medical history, family, social history and medications. REVIEW OF SYMPTOMS      General: Pt denies excessive weight gain or loss. Pt is able to conduct ADL's  HEENT: Denies blurred vision, headaches, hearing loss, epistaxis and difficulty swallowing. Respiratory: Denies cough, congestion, shortness of breath, PAT, wheezing or stridor. Cardiovascular: Denies precordial pain, palpitations, edema or PND  Gastrointestinal: Denies poor appetite, indigestion, abdominal pain or blood in stool  Genitourinary: Denies hematuria, dysuria, increased urinary frequency  Musculoskeletal: Denies joint pain or swelling from muscles or joints  Neurologic: Denies tremor, paresthesias, headache, or sensory motor disturbance  Psychiatric: Denies confusion, insomnia, depression  Integumentray: Denies rash, itching or ulcers. Hematologic: Denies easy bruising, bleeding     PHYSICAL EXAMINATION      Vitals:    08/30/18 1010   BP: 140/65   Pulse: 68   Weight: 232 lb (105.2 kg)   Height: 5' 2\" (1.575 m)     General: Well developed, in no acute distress. HEENT: No jaundice, oral mucosa moist, no oral ulcers  Neck: Supple, no stiffness, no lymphadenopathy, supple  Heart:  Normal S1/S2 negative S3 or S4. Regular, no murmur, gallop or rub, no jugular venous distention  Respiratory: Clear bilaterally x 4, no wheezing or rales    Extremities:  No edema, normal cap refill, no cyanosis. Musculoskeletal: No clubbing, no deformities  Neuro: A&Ox3, speech clear, gait stable, cooperative, no focal neurologic deficits  Skin: Skin color is normal. No rashes or lesions. Non diaphoretic, moist.      EKG: NSR     DIAGNOSTIC DATA     1. Echo  4/27/15 - EF 55-60%    2.  Lipids  3/18/16 - , HDL 69, LDL 73, TG 73  4/4/17- TC 159, HDL 70, LDL 80  4/3/18- , HDL 80, LDL 86, TG 69    3. Lexiscan  4/27/15 - no ischemia    4. Carotid duplex/Vascular studies  2/9/15 - 10-49% bilaterally    5. EKG  7/6/16 - NSR       LABORATORY DATA            Lab Results   Component Value Date/Time    WBC 6.7 04/03/2018 11:29 AM    HGB 12.9 04/03/2018 11:29 AM    HCT 39.7 04/03/2018 11:29 AM    PLATELET 776 (H) 14/22/8237 11:29 AM    MCV 91 04/03/2018 11:29 AM      Lab Results   Component Value Date/Time    Sodium 144 04/03/2018 11:29 AM    Potassium 4.5 04/03/2018 11:29 AM    Chloride 101 04/03/2018 11:29 AM    CO2 24 04/03/2018 11:29 AM    Glucose 105 (H) 04/03/2018 11:29 AM    BUN 12 04/03/2018 11:29 AM    Creatinine 0.81 04/03/2018 11:29 AM    BUN/Creatinine ratio 15 04/03/2018 11:29 AM    GFR est AA 85 04/03/2018 11:29 AM    GFR est non-AA 74 04/03/2018 11:29 AM    Calcium 9.2 04/03/2018 11:29 AM    Bilirubin, total 0.4 04/03/2018 11:29 AM    AST (SGOT) 26 04/03/2018 11:29 AM    Alk. phosphatase 49 04/03/2018 11:29 AM    Protein, total 7.0 04/03/2018 11:29 AM    Albumin 4.4 04/03/2018 11:29 AM    A-G Ratio 1.7 04/03/2018 11:29 AM    ALT (SGPT) 22 04/03/2018 11:29 AM           ASSESSMENT/RECOMMENDATIONS:.      1. Dyslipidemia  - At goal on current medical regimen. - Her lipids are being followed by Michele Herron MD    2. Strong family history of CAD    3. Left subclavian stensois    4. Bilateral carotid disease  - Carotid ultrasound will be ordered today to assess underlying disease    5. HTN  - Blood pressure is under good control on single agent Amlodipine. Follow up in 1 year or PRN    Orders Placed This Encounter    AMB POC EKG ROUTINE W/ 12 LEADS, INTER & REP     Order Specific Question:   Reason for Exam:     Answer:   hypertension        Follow-up Disposition:  Return in about 1 year (around 8/30/2019). I have discussed the diagnosis with  Jyoti Alvarez and the intended plan as seen in the above orders.   Questions were answered concerning future plans. I have discussed medication side effects and warnings with the patient as well. Thank you,  Latonya Guerrero MD for involving me in the care of  Shasha Carolina. Please do not hesitate to contact me for further questions/concerns. Written by Skip No, as dictated by Dr. Brandon Moreira. MD [place], [address] [facility]      [phone] / [phone] Fax

## 2018-09-28 ENCOUNTER — CLINICAL SUPPORT (OUTPATIENT)
Dept: CARDIOLOGY CLINIC | Age: 68
End: 2018-09-28

## 2018-09-28 DIAGNOSIS — I65.23 CAROTID STENOSIS, BILATERAL: Primary | ICD-10-CM

## 2018-09-28 NOTE — PROCEDURES
[facility]  *** FINAL REPORT ***    Name: Ximena Julio  MRN: [id number]       Outpatient  : [id number]  HIS Order #: [id number]  [id number] [facility] Visit #: [id number]  Date: 28 Sep 2018    TYPE OF TEST: Cerebrovascular Duplex    REASON FOR TEST  Known carotid stenosis    Right Carotid:-             Proximal               Mid                 Distal  cm/s  Systolic  Diastolic  Systolic  Diastolic  Systolic  Diastolic  CCA:     27.8       8.0                            89.0       8.0  Bulb:  ECA:    240.0      11.0  ICA:    139.0      17.0      101.0      18.0      102.0      20.0  ICA/CCA:  1.6       2.1    ICA Stenosis: 50-69%    Right Vertebral:-  Finding: Antegrade  Sys:       95.0  Renee:        9.0    Right Subclavian: >50% stenosis    Left Carotid:-            Proximal                Mid                 Distal  cm/s  Systolic  Diastolic  Systolic  Diastolic  Systolic  Diastolic  CCA:    640.2      15.0                            87.0      15.0  Bulb:  ECA:    233.0       7.0  ICA:    108.0      16.0      100.0      18.0       95.0      20.0  ICA/CCA:  1.2       1.1    ICA Stenosis: <50%    Left Vertebral:-  Finding: Antegrade  Sys:       42.0  Renee:        2.0    Left Subclavian: >50% stenosis    INTERPRETATION/FINDINGS  PROCEDURE:  Evaluation of the extracranial cerebrovascular arteries  with ultrasound (B-mode imaging, pulsed Doppler, color Doppler). Includes the common carotid, internal carotid, external carotid, and  vertebral arteries. FINDINGS:    Right:  Moderate calcific plaque is exhibited within the proximal  bifurcation vessels. Color flow imaging demonstrates a considerable  filling defect in this region, where peak systolic velocities are  elevated at 139 cms/ in the ICA and 240 cm/s in the ECA. There is a  significant color flow filling defect in the subclavian artery, where  velocities are elevated at 369 cm/s.     Left: Mild calcific plaque is visualized at the level of the  bifurcation into the proximal internal carotid artery, with more  moderate plaquing into the external carotid artery. A filling defect  is seen on color flow imaging, where peak systolic velocities are  normal at 108 cm/s in the ICA and mildly elevated at 233 cm/s in the  ECA. There is a significant color flow filling defect in the  subclavian artery, where velocities are elevated at 238 cm/s. IMPRESSION:  1. 50-79% stenosis in the right internal carotid artery. 2. <50% stenosis in the left internal carotid artery. 3. Significant stenosis in the external carotid arteries bilaterally. 4. Antegrade flow in both vertebral arteries ( R > L ). 5. Bilateral >50% stenosis of the subclavian arteries. 6. In comparison to the previous study done on 2/6/2015 at [facility], there is no evidence of a significant progression of  stenosis on today's exam.    ADDITIONAL COMMENTS    I have personally reviewed the data relevant to the interpretation of  this  study.     TECHNOLOGIST: SRINIVASAN Garcia  Signed: 09/28/2018 03:44 PM    PHYSICIAN: Ryan Troncoso MD  Signed: 09/28/2018 06:49 PM

## 2018-11-30 ENCOUNTER — OFFICE VISIT (OUTPATIENT)
Dept: FAMILY MEDICINE CLINIC | Age: 68
End: 2018-11-30

## 2018-11-30 VITALS
OXYGEN SATURATION: 97 % | HEART RATE: 65 BPM | SYSTOLIC BLOOD PRESSURE: 120 MMHG | WEIGHT: 234 LBS | DIASTOLIC BLOOD PRESSURE: 62 MMHG | BODY MASS INDEX: 43.06 KG/M2 | RESPIRATION RATE: 16 BRPM | HEIGHT: 62 IN | TEMPERATURE: 98.2 F

## 2018-11-30 DIAGNOSIS — R05.9 COUGH: Primary | ICD-10-CM

## 2018-11-30 DIAGNOSIS — R09.81 NASAL CONGESTION: ICD-10-CM

## 2018-11-30 RX ORDER — FLUTICASONE PROPIONATE 50 MCG
2 SPRAY, SUSPENSION (ML) NASAL DAILY
Qty: 1 BOTTLE | Refills: 0 | Status: SHIPPED | OUTPATIENT
Start: 2018-11-30

## 2018-11-30 RX ORDER — BENZONATATE 100 MG/1
100 CAPSULE ORAL
Qty: 30 CAP | Refills: 0 | Status: SHIPPED | OUTPATIENT
Start: 2018-11-30 | End: 2018-12-07

## 2018-11-30 NOTE — PROGRESS NOTES
Subjective:      Luis Fernando Saul is a 77 y.o. female with history notable for asthma, hypertension, CVD, GERD who presents for productive cough. Has been going on for the past week. Denies any fevers, chills or shortness of breath over this period. Has had some associated nasal congestion over this time. Reports that she has been coughing up white sputum. No known recent sick contacts. Has not used her inhaler over this period. Has no other concerns today. Review of Systems   Constitutional: Negative for chills and fever. Respiratory: Positive for cough. Negative for shortness of breath. Cardiovascular: Negative for chest pain and palpitations. Gastrointestinal: Negative for abdominal pain, nausea and vomiting. PMHx:  Past Medical History:   Diagnosis Date    Bronchial asthma     Hypercholesterolemia     Hypertension        Meds:   Current Outpatient Medications   Medication Sig Dispense Refill    benzonatate (TESSALON) 100 mg capsule Take 1 Cap by mouth three (3) times daily as needed for Cough for up to 7 days. 30 Cap 0    fluticasone (FLONASE) 50 mcg/actuation nasal spray 2 Sprays by Both Nostrils route daily. 1 Bottle 0    albuterol (PROVENTIL HFA, VENTOLIN HFA, PROAIR HFA) 90 mcg/actuation inhaler Take 2 Puffs by inhalation every six (6) hours as needed for Wheezing. 1 Inhaler 1    amLODIPine (NORVASC) 10 mg tablet Take 1 Tab by mouth daily. 90 Tab 3    pravastatin (PRAVACHOL) 40 mg tablet Take 1 Tab by mouth nightly. 90 Tab 3    ferrous sulfate (IRON, FERROUS SULFATE,) 325 mg (65 mg iron) tablet Take 1 Tab by mouth Daily (before breakfast). 90 Tab 0    ascorbic acid, vitamin C, (VITAMIN C) 500 mg chew Take 500 mg by mouth daily.  vitamin E acetate (VITAMIN E PO) Take 1 Cap by mouth daily.  aspirin delayed-release 81 mg tablet Take  by mouth daily.  calcium carbonate (TUMS) 200 mg calcium (500 mg) chew Take 1 Tab by mouth as needed.       docusate sodium (COLACE) 100 mg capsule Take 100 mg by mouth daily.  cholecalciferol, vitamin D3, (VITAMIN D3) 2,000 unit tab Take  by mouth daily. Allergies: Allergies   Allergen Reactions    Beef Containing Products Hives    Keflex [Cephalexin] Hives    Penicillins Hives    Sulfa (Sulfonamide Antibiotics) Unknown (comments)     Patient doesn't know or remember        Smoker:  Social History     Tobacco Use   Smoking Status Former Smoker    Last attempt to quit: 1985    Years since quittin.8   Smokeless Tobacco Never Used       ETOH:   Social History     Substance and Sexual Activity   Alcohol Use No       FH:   Family History   Problem Relation Age of Onset    Hypertension Father     Heart Disease Father     Diabetes Father     Heart Disease Mother     COPD Sister          Objective:     Visit Vitals  /62   Pulse 65   Temp 98.2 °F (36.8 °C) (Oral)   Resp 16   Ht 5' 2\" (1.575 m)   Wt 234 lb (106.1 kg)   LMP 2000   SpO2 97%   BMI 42.80 kg/m²       Physical Exam   Constitutional: She is oriented to person, place, and time. No distress. Cardiovascular: Normal rate, regular rhythm and normal heart sounds. Pulmonary/Chest: Effort normal. No respiratory distress. She has no rales. Mild expiratory wheezing    Abdominal: Soft. Bowel sounds are normal.   Neurological: She is alert and oriented to person, place, and time. Skin: She is not diaphoretic. Assessment:     78 yo female who comes in for:     ICD-10-CM ICD-9-CM    1. Cough R05 786.2 XR CHEST PA LAT      benzonatate (TESSALON) 100 mg capsule   2.  Nasal congestion R09.81 478.19 fluticasone (FLONASE) 50 mcg/actuation nasal spray     XR chest reviewed- no pneumonia     Plan:       URI: Likely viral in nature  - Advised on symptomatic control with saline rinses and nasal sprays, handout given  - Instructed to use  Albuterol every 4-6 hour as needed  - Can use tylenol/motrin as needed for generalized muscle pain and fever  - Can use Sudafed for nasal congestion, Robitussin for cough suppression, and Mucinex for cough expectorant   - Advised on the need to stay well hydrated and that symptoms can last up to 1.5 weeks  - Educated on the lack of benefit of antibiotics in a viral illness  - ED precautions given       Patient is counseled to return to the office if symptoms do not improve as expected. Urgent consultation with the nearest Emergency Department is strongly recommended if condition worsens. Patient is counseled to follow up as recommended and to inform the office if any changes in treatment are recommended.             Signed By:  Carolyn Martin MD    Family Medicine Resident

## 2022-03-19 PROBLEM — E66.01 OBESITY, MORBID (HCC): Status: ACTIVE | Noted: 2018-01-29

## 2022-03-20 PROBLEM — M85.80 OSTEOPENIA: Status: ACTIVE | Noted: 2018-05-04

## 2023-05-25 RX ORDER — ALBUTEROL SULFATE 90 UG/1
2 AEROSOL, METERED RESPIRATORY (INHALATION) EVERY 6 HOURS PRN
COMMUNITY
Start: 2018-10-13

## 2023-05-25 RX ORDER — PSEUDOEPHEDRINE HCL 30 MG
100 TABLET ORAL DAILY
COMMUNITY

## 2023-05-25 RX ORDER — PRAVASTATIN SODIUM 40 MG
40 TABLET ORAL
COMMUNITY
Start: 2018-10-13

## 2023-05-25 RX ORDER — AMLODIPINE BESYLATE 10 MG/1
10 TABLET ORAL DAILY
COMMUNITY
Start: 2018-10-13

## 2023-05-25 RX ORDER — ASPIRIN 81 MG/1
TABLET ORAL DAILY
COMMUNITY

## 2023-05-25 RX ORDER — UREA 10 %
1 LOTION (ML) TOPICAL PRN
COMMUNITY

## 2023-05-25 RX ORDER — FERROUS SULFATE 325(65) MG
325 TABLET ORAL
COMMUNITY
Start: 2018-10-13

## 2023-05-25 RX ORDER — FLUTICASONE PROPIONATE 50 MCG
2 SPRAY, SUSPENSION (ML) NASAL DAILY
COMMUNITY
Start: 2018-11-30